# Patient Record
Sex: FEMALE | Race: WHITE | NOT HISPANIC OR LATINO | Employment: FULL TIME | URBAN - METROPOLITAN AREA
[De-identification: names, ages, dates, MRNs, and addresses within clinical notes are randomized per-mention and may not be internally consistent; named-entity substitution may affect disease eponyms.]

---

## 2017-10-19 ENCOUNTER — OFFICE VISIT - HEALTHEAST (OUTPATIENT)
Dept: FAMILY MEDICINE | Facility: CLINIC | Age: 57
End: 2017-10-19

## 2017-10-19 DIAGNOSIS — R55 SYNCOPE: ICD-10-CM

## 2017-10-19 LAB
ATRIAL RATE - MUSE: 78 BPM
DIASTOLIC BLOOD PRESSURE - MUSE: NORMAL MMHG
INTERPRETATION ECG - MUSE: NORMAL
P AXIS - MUSE: 42 DEGREES
PR INTERVAL - MUSE: 142 MS
QRS DURATION - MUSE: 96 MS
QT - MUSE: 402 MS
QTC - MUSE: 458 MS
R AXIS - MUSE: 5 DEGREES
SYSTOLIC BLOOD PRESSURE - MUSE: NORMAL MMHG
T AXIS - MUSE: 33 DEGREES
VENTRICULAR RATE- MUSE: 78 BPM

## 2017-10-19 RX ORDER — CETIRIZINE HYDROCHLORIDE 10 MG/1
10 TABLET ORAL DAILY PRN
Status: SHIPPED | COMMUNITY
Start: 2017-10-19 | End: 2023-05-12

## 2017-10-23 ENCOUNTER — AMBULATORY - HEALTHEAST (OUTPATIENT)
Dept: FAMILY MEDICINE | Facility: CLINIC | Age: 57
End: 2017-10-23

## 2017-10-30 ENCOUNTER — OFFICE VISIT - HEALTHEAST (OUTPATIENT)
Dept: FAMILY MEDICINE | Facility: CLINIC | Age: 57
End: 2017-10-30

## 2017-10-30 DIAGNOSIS — R55 SYNCOPE: ICD-10-CM

## 2017-10-30 DIAGNOSIS — I10 HYPERTENSION: ICD-10-CM

## 2017-10-30 DIAGNOSIS — Z00.00 ROUTINE GENERAL MEDICAL EXAMINATION AT A HEALTH CARE FACILITY: ICD-10-CM

## 2017-10-30 LAB
CHOLEST SERPL-MCNC: 222 MG/DL
FASTING STATUS PATIENT QL REPORTED: YES
HDLC SERPL-MCNC: 45 MG/DL
LDLC SERPL CALC-MCNC: 140 MG/DL
TRIGL SERPL-MCNC: 187 MG/DL

## 2017-10-30 ASSESSMENT — MIFFLIN-ST. JEOR: SCORE: 1408.07

## 2017-10-31 ENCOUNTER — COMMUNICATION - HEALTHEAST (OUTPATIENT)
Dept: FAMILY MEDICINE | Facility: CLINIC | Age: 57
End: 2017-10-31

## 2017-10-31 ENCOUNTER — RECORDS - HEALTHEAST (OUTPATIENT)
Dept: VASCULAR ULTRASOUND | Facility: CLINIC | Age: 57
End: 2017-10-31

## 2017-10-31 ENCOUNTER — RECORDS - HEALTHEAST (OUTPATIENT)
Dept: ADMINISTRATIVE | Facility: OTHER | Age: 57
End: 2017-10-31

## 2017-10-31 DIAGNOSIS — R55 SYNCOPE AND COLLAPSE: ICD-10-CM

## 2017-11-09 ENCOUNTER — COMMUNICATION - HEALTHEAST (OUTPATIENT)
Dept: SCHEDULING | Facility: CLINIC | Age: 57
End: 2017-11-09

## 2017-11-17 ENCOUNTER — COMMUNICATION - HEALTHEAST (OUTPATIENT)
Dept: FAMILY MEDICINE | Facility: CLINIC | Age: 57
End: 2017-11-17

## 2017-11-18 ENCOUNTER — COMMUNICATION - HEALTHEAST (OUTPATIENT)
Dept: FAMILY MEDICINE | Facility: CLINIC | Age: 57
End: 2017-11-18

## 2017-11-18 DIAGNOSIS — I10 HTN (HYPERTENSION): ICD-10-CM

## 2017-11-20 ENCOUNTER — AMBULATORY - HEALTHEAST (OUTPATIENT)
Dept: FAMILY MEDICINE | Facility: CLINIC | Age: 57
End: 2017-11-20

## 2017-11-20 DIAGNOSIS — I16.0 HYPERTENSIVE URGENCY: ICD-10-CM

## 2017-12-11 ENCOUNTER — HOSPITAL ENCOUNTER (OUTPATIENT)
Dept: MAMMOGRAPHY | Facility: CLINIC | Age: 57
Discharge: HOME | End: 2017-12-11
Attending: FAMILY MEDICINE

## 2017-12-11 DIAGNOSIS — Z00.00 ROUTINE GENERAL MEDICAL EXAMINATION AT A HEALTH CARE FACILITY: ICD-10-CM

## 2018-02-01 ENCOUNTER — COMMUNICATION - HEALTHEAST (OUTPATIENT)
Dept: TELEHEALTH | Facility: CLINIC | Age: 58
End: 2018-02-01

## 2018-02-01 ENCOUNTER — OFFICE VISIT - HEALTHEAST (OUTPATIENT)
Dept: FAMILY MEDICINE | Facility: CLINIC | Age: 58
End: 2018-02-01

## 2018-02-01 DIAGNOSIS — I10 HYPERTENSION: ICD-10-CM

## 2018-02-01 LAB
ANION GAP SERPL CALCULATED.3IONS-SCNC: 12 MMOL/L (ref 5–18)
BUN SERPL-MCNC: 6 MG/DL (ref 8–22)
CALCIUM SERPL-MCNC: 9.6 MG/DL (ref 8.5–10.5)
CHLORIDE BLD-SCNC: 94 MMOL/L (ref 98–107)
CO2 SERPL-SCNC: 26 MMOL/L (ref 22–31)
CREAT SERPL-MCNC: 0.7 MG/DL (ref 0.6–1.1)
GFR SERPL CREATININE-BSD FRML MDRD: >60 ML/MIN/1.73M2
GLUCOSE BLD-MCNC: 96 MG/DL (ref 70–125)
POTASSIUM BLD-SCNC: 3.8 MMOL/L (ref 3.5–5)
SODIUM SERPL-SCNC: 132 MMOL/L (ref 136–145)

## 2018-02-02 ENCOUNTER — COMMUNICATION - HEALTHEAST (OUTPATIENT)
Dept: FAMILY MEDICINE | Facility: CLINIC | Age: 58
End: 2018-02-02

## 2018-02-11 ENCOUNTER — COMMUNICATION - HEALTHEAST (OUTPATIENT)
Dept: FAMILY MEDICINE | Facility: CLINIC | Age: 58
End: 2018-02-11

## 2018-02-11 DIAGNOSIS — I16.0 HYPERTENSIVE URGENCY: ICD-10-CM

## 2018-02-12 ENCOUNTER — COMMUNICATION - HEALTHEAST (OUTPATIENT)
Dept: FAMILY MEDICINE | Facility: CLINIC | Age: 58
End: 2018-02-12

## 2018-02-16 ENCOUNTER — COMMUNICATION - HEALTHEAST (OUTPATIENT)
Dept: MEDSURG UNIT | Facility: HOSPITAL | Age: 58
End: 2018-02-16

## 2018-02-16 DIAGNOSIS — E03.9 HYPOTHYROID: ICD-10-CM

## 2018-03-18 ENCOUNTER — COMMUNICATION - HEALTHEAST (OUTPATIENT)
Dept: MEDSURG UNIT | Facility: HOSPITAL | Age: 58
End: 2018-03-18

## 2018-03-18 DIAGNOSIS — E03.9 HYPOTHYROID: ICD-10-CM

## 2018-04-17 ENCOUNTER — TELEPHONE (OUTPATIENT)
Dept: OTHER | Facility: CLINIC | Age: 58
End: 2018-04-17

## 2018-04-17 NOTE — TELEPHONE ENCOUNTER
4/17/2018    Call Regarding Onboarding [facility] [facility] Other     Attempt 1    Message on voicemail     Comments: 0 DEP       Outreach   CC

## 2018-04-24 ENCOUNTER — COMMUNICATION - HEALTHEAST (OUTPATIENT)
Dept: FAMILY MEDICINE | Facility: CLINIC | Age: 58
End: 2018-04-24

## 2018-04-24 DIAGNOSIS — I16.0 HYPERTENSIVE URGENCY: ICD-10-CM

## 2018-05-18 ENCOUNTER — OFFICE VISIT - HEALTHEAST (OUTPATIENT)
Dept: FAMILY MEDICINE | Facility: CLINIC | Age: 58
End: 2018-05-18

## 2018-05-18 DIAGNOSIS — I16.0 HYPERTENSIVE URGENCY: ICD-10-CM

## 2018-05-18 DIAGNOSIS — E03.9 HYPOTHYROID: ICD-10-CM

## 2018-05-18 DIAGNOSIS — I10 HYPERTENSION: ICD-10-CM

## 2018-05-18 DIAGNOSIS — M25.473 ANKLE SWELLING: ICD-10-CM

## 2018-05-18 LAB
ANION GAP SERPL CALCULATED.3IONS-SCNC: 12 MMOL/L (ref 5–18)
BUN SERPL-MCNC: 9 MG/DL (ref 8–22)
CALCIUM SERPL-MCNC: 10 MG/DL (ref 8.5–10.5)
CHLORIDE BLD-SCNC: 100 MMOL/L (ref 98–107)
CO2 SERPL-SCNC: 28 MMOL/L (ref 22–31)
CREAT SERPL-MCNC: 0.72 MG/DL (ref 0.6–1.1)
GFR SERPL CREATININE-BSD FRML MDRD: >60 ML/MIN/1.73M2
GLUCOSE BLD-MCNC: 99 MG/DL (ref 70–125)
POTASSIUM BLD-SCNC: 3.6 MMOL/L (ref 3.5–5)
SODIUM SERPL-SCNC: 140 MMOL/L (ref 136–145)
TSH SERPL DL<=0.005 MIU/L-ACNC: 0.23 UIU/ML (ref 0.3–5)

## 2018-05-21 ENCOUNTER — AMBULATORY - HEALTHEAST (OUTPATIENT)
Dept: FAMILY MEDICINE | Facility: CLINIC | Age: 58
End: 2018-05-21

## 2018-05-22 ENCOUNTER — COMMUNICATION - HEALTHEAST (OUTPATIENT)
Dept: FAMILY MEDICINE | Facility: CLINIC | Age: 58
End: 2018-05-22

## 2018-06-05 NOTE — TELEPHONE ENCOUNTER
6/5/2018    Call Regarding Onboarding [facility] [place] [facility] OTHER    Attempt 3    Message on voicemail     Comments: no dep      Outreach   CASSIUS

## 2018-07-26 ENCOUNTER — COMMUNICATION - HEALTHEAST (OUTPATIENT)
Dept: FAMILY MEDICINE | Facility: CLINIC | Age: 58
End: 2018-07-26

## 2018-08-22 ENCOUNTER — COMMUNICATION - HEALTHEAST (OUTPATIENT)
Dept: FAMILY MEDICINE | Facility: CLINIC | Age: 58
End: 2018-08-22

## 2018-08-22 DIAGNOSIS — E03.9 HYPOTHYROID: ICD-10-CM

## 2018-10-26 ENCOUNTER — COMMUNICATION - HEALTHEAST (OUTPATIENT)
Dept: FAMILY MEDICINE | Facility: CLINIC | Age: 58
End: 2018-10-26

## 2018-10-26 DIAGNOSIS — I16.0 HYPERTENSIVE URGENCY: ICD-10-CM

## 2018-11-16 ENCOUNTER — COMMUNICATION - HEALTHEAST (OUTPATIENT)
Dept: TELEHEALTH | Facility: CLINIC | Age: 58
End: 2018-11-16

## 2018-11-16 ENCOUNTER — OFFICE VISIT - HEALTHEAST (OUTPATIENT)
Dept: FAMILY MEDICINE | Facility: CLINIC | Age: 58
End: 2018-11-16

## 2018-11-16 DIAGNOSIS — E03.9 HYPOTHYROIDISM, UNSPECIFIED TYPE: ICD-10-CM

## 2018-11-16 DIAGNOSIS — I10 ESSENTIAL HYPERTENSION: ICD-10-CM

## 2018-11-16 DIAGNOSIS — Z71.6 ENCOUNTER FOR SMOKING CESSATION COUNSELING: ICD-10-CM

## 2018-11-16 DIAGNOSIS — Z12.11 SCREEN FOR COLON CANCER: ICD-10-CM

## 2018-11-16 DIAGNOSIS — E66.01 MORBID OBESITY (H): ICD-10-CM

## 2018-11-16 DIAGNOSIS — Z00.00 ANNUAL PHYSICAL EXAM: ICD-10-CM

## 2018-11-16 LAB
ANION GAP SERPL CALCULATED.3IONS-SCNC: 11 MMOL/L (ref 5–18)
BUN SERPL-MCNC: 8 MG/DL (ref 8–22)
CALCIUM SERPL-MCNC: 10.1 MG/DL (ref 8.5–10.5)
CHLORIDE BLD-SCNC: 99 MMOL/L (ref 98–107)
CHOLEST SERPL-MCNC: 236 MG/DL
CO2 SERPL-SCNC: 28 MMOL/L (ref 22–31)
CREAT SERPL-MCNC: 0.74 MG/DL (ref 0.6–1.1)
FASTING STATUS PATIENT QL REPORTED: YES
GFR SERPL CREATININE-BSD FRML MDRD: >60 ML/MIN/1.73M2
GLUCOSE BLD-MCNC: 96 MG/DL (ref 70–125)
HDLC SERPL-MCNC: 47 MG/DL
LDLC SERPL CALC-MCNC: 152 MG/DL
POTASSIUM BLD-SCNC: 3.7 MMOL/L (ref 3.5–5)
SODIUM SERPL-SCNC: 138 MMOL/L (ref 136–145)
TRIGL SERPL-MCNC: 187 MG/DL
TSH SERPL DL<=0.005 MIU/L-ACNC: 1.15 UIU/ML (ref 0.3–5)

## 2018-11-16 ASSESSMENT — MIFFLIN-ST. JEOR: SCORE: 1402

## 2018-11-19 ENCOUNTER — COMMUNICATION - HEALTHEAST (OUTPATIENT)
Dept: FAMILY MEDICINE | Facility: CLINIC | Age: 58
End: 2018-11-19

## 2018-11-29 ENCOUNTER — COMMUNICATION - HEALTHEAST (OUTPATIENT)
Dept: FAMILY MEDICINE | Facility: CLINIC | Age: 58
End: 2018-11-29

## 2018-12-04 ENCOUNTER — COMMUNICATION - HEALTHEAST (OUTPATIENT)
Dept: FAMILY MEDICINE | Facility: CLINIC | Age: 58
End: 2018-12-04

## 2018-12-26 ENCOUNTER — RECORDS - HEALTHEAST (OUTPATIENT)
Dept: ADMINISTRATIVE | Facility: OTHER | Age: 58
End: 2018-12-26

## 2019-08-07 ENCOUNTER — COMMUNICATION - HEALTHEAST (OUTPATIENT)
Dept: FAMILY MEDICINE | Facility: CLINIC | Age: 59
End: 2019-08-07

## 2019-08-07 DIAGNOSIS — E03.9 HYPOTHYROID: ICD-10-CM

## 2019-11-05 ENCOUNTER — COMMUNICATION - HEALTHEAST (OUTPATIENT)
Dept: FAMILY MEDICINE | Facility: CLINIC | Age: 59
End: 2019-11-05

## 2019-11-05 DIAGNOSIS — I10 ESSENTIAL HYPERTENSION: ICD-10-CM

## 2019-11-05 DIAGNOSIS — E03.9 HYPOTHYROID: ICD-10-CM

## 2020-02-03 ENCOUNTER — COMMUNICATION - HEALTHEAST (OUTPATIENT)
Dept: FAMILY MEDICINE | Facility: CLINIC | Age: 60
End: 2020-02-03

## 2020-02-03 DIAGNOSIS — I10 ESSENTIAL HYPERTENSION: ICD-10-CM

## 2020-02-03 DIAGNOSIS — E03.9 HYPOTHYROID: ICD-10-CM

## 2020-05-03 ENCOUNTER — COMMUNICATION - HEALTHEAST (OUTPATIENT)
Dept: FAMILY MEDICINE | Facility: CLINIC | Age: 60
End: 2020-05-03

## 2020-05-03 DIAGNOSIS — E03.9 HYPOTHYROID: ICD-10-CM

## 2020-05-03 DIAGNOSIS — I10 ESSENTIAL HYPERTENSION: ICD-10-CM

## 2020-06-02 ENCOUNTER — COMMUNICATION - HEALTHEAST (OUTPATIENT)
Dept: FAMILY MEDICINE | Facility: CLINIC | Age: 60
End: 2020-06-02

## 2020-06-02 DIAGNOSIS — E03.9 HYPOTHYROID: ICD-10-CM

## 2020-06-02 DIAGNOSIS — I10 ESSENTIAL HYPERTENSION: ICD-10-CM

## 2020-07-02 ENCOUNTER — COMMUNICATION - HEALTHEAST (OUTPATIENT)
Dept: FAMILY MEDICINE | Facility: CLINIC | Age: 60
End: 2020-07-02

## 2020-07-02 DIAGNOSIS — E03.9 HYPOTHYROID: ICD-10-CM

## 2020-07-02 DIAGNOSIS — I10 ESSENTIAL HYPERTENSION: ICD-10-CM

## 2020-08-06 ENCOUNTER — COMMUNICATION - HEALTHEAST (OUTPATIENT)
Dept: FAMILY MEDICINE | Facility: CLINIC | Age: 60
End: 2020-08-06

## 2020-08-06 DIAGNOSIS — E03.9 HYPOTHYROID: ICD-10-CM

## 2020-08-06 DIAGNOSIS — I10 ESSENTIAL HYPERTENSION: ICD-10-CM

## 2020-08-31 ENCOUNTER — COMMUNICATION - HEALTHEAST (OUTPATIENT)
Dept: FAMILY MEDICINE | Facility: CLINIC | Age: 60
End: 2020-08-31

## 2020-08-31 DIAGNOSIS — I10 ESSENTIAL HYPERTENSION: ICD-10-CM

## 2020-08-31 DIAGNOSIS — E03.9 HYPOTHYROID: ICD-10-CM

## 2020-09-04 ENCOUNTER — OFFICE VISIT - HEALTHEAST (OUTPATIENT)
Dept: FAMILY MEDICINE | Facility: CLINIC | Age: 60
End: 2020-09-04

## 2020-09-04 DIAGNOSIS — E66.01 MORBID OBESITY (H): ICD-10-CM

## 2020-09-04 DIAGNOSIS — E03.9 HYPOTHYROIDISM, UNSPECIFIED TYPE: ICD-10-CM

## 2020-09-04 DIAGNOSIS — E03.9 HYPOTHYROID: ICD-10-CM

## 2020-09-04 DIAGNOSIS — E78.2 MIXED HYPERLIPIDEMIA: ICD-10-CM

## 2020-09-04 DIAGNOSIS — I10 ESSENTIAL HYPERTENSION: ICD-10-CM

## 2020-09-04 DIAGNOSIS — Z71.6 ENCOUNTER FOR SMOKING CESSATION COUNSELING: ICD-10-CM

## 2020-09-04 DIAGNOSIS — Z00.00 ROUTINE GENERAL MEDICAL EXAMINATION AT A HEALTH CARE FACILITY: ICD-10-CM

## 2020-09-04 LAB
ANION GAP SERPL CALCULATED.3IONS-SCNC: 12 MMOL/L (ref 5–18)
BUN SERPL-MCNC: 5 MG/DL (ref 8–22)
CALCIUM SERPL-MCNC: 10.1 MG/DL (ref 8.5–10.5)
CHLORIDE BLD-SCNC: 98 MMOL/L (ref 98–107)
CHOLEST SERPL-MCNC: 227 MG/DL
CO2 SERPL-SCNC: 29 MMOL/L (ref 22–31)
CREAT SERPL-MCNC: 0.82 MG/DL (ref 0.6–1.1)
ERYTHROCYTE [DISTWIDTH] IN BLOOD BY AUTOMATED COUNT: 12.2 % (ref 11–14.5)
FASTING STATUS PATIENT QL REPORTED: YES
GFR SERPL CREATININE-BSD FRML MDRD: >60 ML/MIN/1.73M2
GLUCOSE BLD-MCNC: 102 MG/DL (ref 70–125)
HCT VFR BLD AUTO: 46.3 % (ref 35–47)
HDLC SERPL-MCNC: 48 MG/DL
HGB BLD-MCNC: 15.4 G/DL (ref 12–16)
LDLC SERPL CALC-MCNC: 145 MG/DL
MCH RBC QN AUTO: 28.3 PG (ref 27–34)
MCHC RBC AUTO-ENTMCNC: 33.2 G/DL (ref 32–36)
MCV RBC AUTO: 85 FL (ref 80–100)
PLATELET # BLD AUTO: 334 THOU/UL (ref 140–440)
PMV BLD AUTO: 7.4 FL (ref 7–10)
POTASSIUM BLD-SCNC: 3.3 MMOL/L (ref 3.5–5)
RBC # BLD AUTO: 5.44 MILL/UL (ref 3.8–5.4)
SODIUM SERPL-SCNC: 139 MMOL/L (ref 136–145)
TRIGL SERPL-MCNC: 171 MG/DL
TSH SERPL DL<=0.005 MIU/L-ACNC: 0.95 UIU/ML (ref 0.3–5)
WBC: 7.4 THOU/UL (ref 4–11)

## 2020-09-04 RX ORDER — LEVOTHYROXINE SODIUM 112 UG/1
112 TABLET ORAL DAILY
Qty: 90 TABLET | Refills: 3 | Status: SHIPPED | OUTPATIENT
Start: 2020-09-04 | End: 2021-08-29

## 2020-09-04 RX ORDER — AMLODIPINE BESYLATE 10 MG/1
10 TABLET ORAL DAILY
Qty: 90 TABLET | Refills: 3 | Status: SHIPPED | OUTPATIENT
Start: 2020-09-04 | End: 2021-08-29

## 2020-09-04 RX ORDER — LOSARTAN POTASSIUM 100 MG/1
100 TABLET ORAL DAILY
Qty: 90 TABLET | Refills: 3 | Status: SHIPPED | OUTPATIENT
Start: 2020-09-04 | End: 2021-08-29

## 2020-09-04 RX ORDER — HYDROCHLOROTHIAZIDE 25 MG/1
25 TABLET ORAL DAILY
Qty: 90 TABLET | Refills: 3 | Status: SHIPPED | OUTPATIENT
Start: 2020-09-04 | End: 2021-08-29

## 2020-09-04 ASSESSMENT — MIFFLIN-ST. JEOR: SCORE: 1384.94

## 2020-09-08 ENCOUNTER — AMBULATORY - HEALTHEAST (OUTPATIENT)
Dept: FAMILY MEDICINE | Facility: CLINIC | Age: 60
End: 2020-09-08

## 2020-09-08 DIAGNOSIS — R79.89 LOW VITAMIN D LEVEL: ICD-10-CM

## 2020-09-08 LAB
25(OH)D3 SERPL-MCNC: 9.6 NG/ML (ref 30–80)
25(OH)D3 SERPL-MCNC: 9.6 NG/ML (ref 30–80)

## 2021-03-30 ENCOUNTER — AMBULATORY - HEALTHEAST (OUTPATIENT)
Dept: NURSING | Facility: CLINIC | Age: 61
End: 2021-03-30

## 2021-04-20 ENCOUNTER — AMBULATORY - HEALTHEAST (OUTPATIENT)
Dept: NURSING | Facility: CLINIC | Age: 61
End: 2021-04-20

## 2021-05-31 VITALS — HEIGHT: 62 IN | WEIGHT: 199.25 LBS | BODY MASS INDEX: 36.67 KG/M2

## 2021-05-31 VITALS — WEIGHT: 201.6 LBS

## 2021-05-31 NOTE — TELEPHONE ENCOUNTER
Refill Approved    Rx renewed per Medication Renewal Policy. Medication was last renewed on 8/22/18.    Adeola Alegre, [facility] Triage/Med Refill 8/8/2019     Requested Prescriptions   Pending Prescriptions Disp Refills     levothyroxine (SYNTHROID, LEVOTHROID) 112 MCG tablet 90 tablet 0     Sig: TAKE 1 TABLET(112 MCG) BY MOUTH DAILY       Thyroid Hormones Protocol Passed - 8/7/2019  9:23 AM        Passed - Provider visit in past 12 months or next 3 months     Last office visit with prescriber/PCP: Visit date not found OR same dept: Visit date not found OR same specialty: 5/18/2018 Ebony Cali MD  Last physical: 11/16/2018 Last MTM visit: Visit date not found   Next visit within 3 mo: Visit date not found  Next physical within 3 mo: Visit date not found  Prescriber OR PCP: Carol Liang MD  Last diagnosis associated with med order: 1. Hypothyroid  - levothyroxine (SYNTHROID, LEVOTHROID) 112 MCG tablet; TAKE 1 TABLET(112 MCG) BY MOUTH DAILY  Dispense: 90 tablet; Refill: 0    If protocol passes may refill for 12 months if within 3 months of last provider visit (or a total of 15 months).             Passed - TSH on file in past 12 months for patient age 12 & older     TSH   Date Value Ref Range Status   11/16/2018 1.15 0.30 - 5.00 uIU/mL Final

## 2021-06-01 VITALS — WEIGHT: 195.7 LBS | BODY MASS INDEX: 36.08 KG/M2

## 2021-06-01 VITALS — WEIGHT: 197.8 LBS | BODY MASS INDEX: 36.47 KG/M2

## 2021-06-02 VITALS — BODY MASS INDEX: 37.53 KG/M2 | WEIGHT: 198.8 LBS | HEIGHT: 61 IN

## 2021-06-03 NOTE — TELEPHONE ENCOUNTER
Refill Approved    Rx renewed per Medication Renewal Policy. Medication was last renewed on 11/6/18 and 8/8/19.    Brandie Kahn, [facility] Connection Triage/Med Refill 11/5/2019     Requested Prescriptions   Pending Prescriptions Disp Refills     amLODIPine (NORVASC) 10 MG tablet [Pharmacy Med Name: AMLODIPINE BESYLATE 10MG TABLETS] 90 tablet 0     Sig: TAKE 1 TABLET BY MOUTH DAILY       Calcium-Channel Blockers Protocol Passed - 11/5/2019  5:12 AM        Passed - PCP or prescribing provider visit in past 12 months or next 3 months     Last office visit with prescriber/PCP: Visit date not found OR same dept: Visit date not found OR same specialty: 5/18/2018 Ebony Cali MD  Last physical: 11/16/2018 Last MTM visit: Visit date not found   Next visit within 3 mo: Visit date not found  Next physical within 3 mo: Visit date not found  Prescriber OR PCP: Carol Liang MD  Last diagnosis associated with med order: 1. Essential hypertension  - amLODIPine (NORVASC) 10 MG tablet [Pharmacy Med Name: AMLODIPINE BESYLATE 10MG TABLETS]; TAKE 1 TABLET BY MOUTH DAILY  Dispense: 90 tablet; Refill: 0    2. Hypothyroid  - levothyroxine (SYNTHROID, LEVOTHROID) 112 MCG tablet [Pharmacy Med Name: LEVOTHYROXINE 0.112MG (112MCG) TABS]; TAKE 1 TABLET(112 MCG) BY MOUTH DAILY  Dispense: 90 tablet; Refill: 0    If protocol passes may refill for 12 months if within 3 months of last provider visit (or a total of 15 months).             Passed - Blood pressure filed in past 12 months     BP Readings from Last 1 Encounters:   11/16/18 138/80             levothyroxine (SYNTHROID, LEVOTHROID) 112 MCG tablet [Pharmacy Med Name: LEVOTHYROXINE 0.112MG (112MCG) TABS] 90 tablet 0     Sig: TAKE 1 TABLET(112 MCG) BY MOUTH DAILY       Thyroid Hormones Protocol Passed - 11/5/2019  5:12 AM        Passed - Provider visit in past 12 months or next 3 months     Last office visit with prescriber/PCP: Visit date not found OR same dept: Visit date not  found OR same specialty: 5/18/2018 Ebony Cali MD  Last physical: 11/16/2018 Last MTM visit: Visit date not found   Next visit within 3 mo: Visit date not found  Next physical within 3 mo: Visit date not found  Prescriber OR PCP: Carol Liang MD  Last diagnosis associated with med order: 1. Essential hypertension  - amLODIPine (NORVASC) 10 MG tablet [Pharmacy Med Name: AMLODIPINE BESYLATE 10MG TABLETS]; TAKE 1 TABLET BY MOUTH DAILY  Dispense: 90 tablet; Refill: 0    2. Hypothyroid  - levothyroxine (SYNTHROID, LEVOTHROID) 112 MCG tablet [Pharmacy Med Name: LEVOTHYROXINE 0.112MG (112MCG) TABS]; TAKE 1 TABLET(112 MCG) BY MOUTH DAILY  Dispense: 90 tablet; Refill: 0    If protocol passes may refill for 12 months if within 3 months of last provider visit (or a total of 15 months).             Passed - TSH on file in past 12 months for patient age 12 & older     TSH   Date Value Ref Range Status   11/16/2018 1.15 0.30 - 5.00 uIU/mL Final

## 2021-06-04 VITALS
DIASTOLIC BLOOD PRESSURE: 76 MMHG | HEIGHT: 61 IN | WEIGHT: 195.9 LBS | BODY MASS INDEX: 36.99 KG/M2 | SYSTOLIC BLOOD PRESSURE: 128 MMHG | HEART RATE: 84 BPM

## 2021-06-05 NOTE — TELEPHONE ENCOUNTER
Reached out to patient and left a message to return phone call. When patient calls back, please relay the below message and assist with scheduling. Thank you, Jenna Gold

## 2021-06-05 NOTE — TELEPHONE ENCOUNTER
RN cannot approve Refill Request    RN can NOT refill this medication Protocol failed and NO refill given       Dorothy Camarillo, [facility] Connection Triage/Med Refill 2/3/2020    Requested Prescriptions   Pending Prescriptions Disp Refills     hydroCHLOROthiazide (HYDRODIURIL) 25 MG tablet [Pharmacy Med Name: HYDROCHLOROTHIAZIDE 25MG TABLETS] 90 tablet 3     Sig: TAKE 1 TABLET(25 MG) BY MOUTH DAILY       Diuretics/Combination Diuretics Refill Protocol  Failed - 2/3/2020  5:40 AM        Failed - Visit with PCP or prescribing provider visit in past 12 months     Last office visit with prescriber/PCP: Visit date not found OR same dept: Visit date not found OR same specialty: 5/18/2018 Ebony Cali MD  Last physical: 11/16/2018 Last MTM visit: Visit date not found   Next visit within 3 mo: Visit date not found  Next physical within 3 mo: Visit date not found  Prescriber OR PCP: Carol Liang MD  Last diagnosis associated with med order: 1. Essential hypertension  - hydroCHLOROthiazide (HYDRODIURIL) 25 MG tablet [Pharmacy Med Name: HYDROCHLOROTHIAZIDE 25MG TABLETS]; TAKE 1 TABLET(25 MG) BY MOUTH DAILY  Dispense: 90 tablet; Refill: 3  - losartan (COZAAR) 100 MG tablet [Pharmacy Med Name: LOSARTAN 100MG TABLETS]; TAKE 1 TABLET(100 MG) BY MOUTH DAILY  Dispense: 90 tablet; Refill: 3  - amLODIPine (NORVASC) 10 MG tablet [Pharmacy Med Name: AMLODIPINE BESYLATE 10MG TABLETS]; TAKE 1 TABLET BY MOUTH DAILY  Dispense: 90 tablet; Refill: 0    2. Hypothyroid  - levothyroxine (SYNTHROID, LEVOTHROID) 112 MCG tablet [Pharmacy Med Name: LEVOTHYROXINE 0.112MG (112MCG) TABS]; TAKE 1 TABLET(112 MCG) BY MOUTH DAILY  Dispense: 90 tablet; Refill: 0    If protocol passes may refill for 12 months if within 3 months of last provider visit (or a total of 15 months).             Failed - Serum Potassium in past 12 months      No results found for: LN-POTASSIUM          Failed - Serum Sodium in past 12 months      No results found for:  LN-SODIUM          Failed - Blood pressure on file in past 12 months     BP Readings from Last 1 Encounters:   11/16/18 138/80             Failed - Serum Creatinine in past 12 months      Creatinine   Date Value Ref Range Status   11/16/2018 0.74 0.60 - 1.10 mg/dL Final             levothyroxine (SYNTHROID, LEVOTHROID) 112 MCG tablet [Pharmacy Med Name: LEVOTHYROXINE 0.112MG (112MCG) TABS] 90 tablet 3     Sig: TAKE 1 TABLET(112 MCG) BY MOUTH DAILY       Thyroid Hormones Protocol Failed - 2/3/2020  5:40 AM        Failed - Provider visit in past 12 months or next 3 months     Last office visit with prescriber/PCP: Visit date not found OR same dept: Visit date not found OR same specialty: 5/18/2018 Ebony Cali MD  Last physical: 11/16/2018 Last MTM visit: Visit date not found   Next visit within 3 mo: Visit date not found  Next physical within 3 mo: Visit date not found  Prescriber OR PCP: Carol Liang MD  Last diagnosis associated with med order: 1. Essential hypertension  - hydroCHLOROthiazide (HYDRODIURIL) 25 MG tablet [Pharmacy Med Name: HYDROCHLOROTHIAZIDE 25MG TABLETS]; TAKE 1 TABLET(25 MG) BY MOUTH DAILY  Dispense: 90 tablet; Refill: 3  - losartan (COZAAR) 100 MG tablet [Pharmacy Med Name: LOSARTAN 100MG TABLETS]; TAKE 1 TABLET(100 MG) BY MOUTH DAILY  Dispense: 90 tablet; Refill: 3  - amLODIPine (NORVASC) 10 MG tablet [Pharmacy Med Name: AMLODIPINE BESYLATE 10MG TABLETS]; TAKE 1 TABLET BY MOUTH DAILY  Dispense: 90 tablet; Refill: 0    2. Hypothyroid  - levothyroxine (SYNTHROID, LEVOTHROID) 112 MCG tablet [Pharmacy Med Name: LEVOTHYROXINE 0.112MG (112MCG) TABS]; TAKE 1 TABLET(112 MCG) BY MOUTH DAILY  Dispense: 90 tablet; Refill: 0    If protocol passes may refill for 12 months if within 3 months of last provider visit (or a total of 15 months).             Failed - TSH on file in past 12 months for patient age 12 & older     TSH   Date Value Ref Range Status   11/16/2018 1.15 0.30 - 5.00 uIU/mL  Final                   losartan (COZAAR) 100 MG tablet [Pharmacy Med Name: LOSARTAN 100MG TABLETS] 90 tablet 3     Sig: TAKE 1 TABLET(100 MG) BY MOUTH DAILY       Angiotensin Receptor Blocker Protocol Failed - 2/3/2020  5:40 AM        Failed - PCP or prescribing provider visit in past 12 months       Last office visit with prescriber/PCP: Visit date not found OR same dept: Visit date not found OR same specialty: 5/18/2018 Ebnoy Clai MD  Last physical: 11/16/2018 Last MTM visit: Visit date not found   Next visit within 3 mo: Visit date not found  Next physical within 3 mo: Visit date not found  Prescriber OR PCP: Carol Liang MD  Last diagnosis associated with med order: 1. Essential hypertension  - hydroCHLOROthiazide (HYDRODIURIL) 25 MG tablet [Pharmacy Med Name: HYDROCHLOROTHIAZIDE 25MG TABLETS]; TAKE 1 TABLET(25 MG) BY MOUTH DAILY  Dispense: 90 tablet; Refill: 3  - losartan (COZAAR) 100 MG tablet [Pharmacy Med Name: LOSARTAN 100MG TABLETS]; TAKE 1 TABLET(100 MG) BY MOUTH DAILY  Dispense: 90 tablet; Refill: 3  - amLODIPine (NORVASC) 10 MG tablet [Pharmacy Med Name: AMLODIPINE BESYLATE 10MG TABLETS]; TAKE 1 TABLET BY MOUTH DAILY  Dispense: 90 tablet; Refill: 0    2. Hypothyroid  - levothyroxine (SYNTHROID, LEVOTHROID) 112 MCG tablet [Pharmacy Med Name: LEVOTHYROXINE 0.112MG (112MCG) TABS]; TAKE 1 TABLET(112 MCG) BY MOUTH DAILY  Dispense: 90 tablet; Refill: 0    If protocol passes may refill for 12 months if within 3 months of last provider visit (or a total of 15 months).             Failed - Serum potassium within the past 12 months     No results found for: LN-POTASSIUM          Failed - Blood pressure filed in past 12 months     BP Readings from Last 1 Encounters:   11/16/18 138/80             Failed - Serum creatinine within the past 12 months     Creatinine   Date Value Ref Range Status   11/16/2018 0.74 0.60 - 1.10 mg/dL Final             amLODIPine (NORVASC) 10 MG tablet [Pharmacy Med  Name: AMLODIPINE BESYLATE 10MG TABLETS] 90 tablet 3     Sig: TAKE 1 TABLET BY MOUTH DAILY       Calcium-Channel Blockers Protocol Failed - 2/3/2020  5:40 AM        Failed - PCP or prescribing provider visit in past 12 months or next 3 months     Last office visit with prescriber/PCP: Visit date not found OR same dept: Visit date not found OR same specialty: 5/18/2018 Ebony Cali MD  Last physical: 11/16/2018 Last MTM visit: Visit date not found   Next visit within 3 mo: Visit date not found  Next physical within 3 mo: Visit date not found  Prescriber OR PCP: Carol Liang MD  Last diagnosis associated with med order: 1. Essential hypertension  - hydroCHLOROthiazide (HYDRODIURIL) 25 MG tablet [Pharmacy Med Name: HYDROCHLOROTHIAZIDE 25MG TABLETS]; TAKE 1 TABLET(25 MG) BY MOUTH DAILY  Dispense: 90 tablet; Refill: 3  - losartan (COZAAR) 100 MG tablet [Pharmacy Med Name: LOSARTAN 100MG TABLETS]; TAKE 1 TABLET(100 MG) BY MOUTH DAILY  Dispense: 90 tablet; Refill: 3  - amLODIPine (NORVASC) 10 MG tablet [Pharmacy Med Name: AMLODIPINE BESYLATE 10MG TABLETS]; TAKE 1 TABLET BY MOUTH DAILY  Dispense: 90 tablet; Refill: 0    2. Hypothyroid  - levothyroxine (SYNTHROID, LEVOTHROID) 112 MCG tablet [Pharmacy Med Name: LEVOTHYROXINE 0.112MG (112MCG) TABS]; TAKE 1 TABLET(112 MCG) BY MOUTH DAILY  Dispense: 90 tablet; Refill: 0    If protocol passes may refill for 12 months if within 3 months of last provider visit (or a total of 15 months).             Failed - Blood pressure filed in past 12 months     BP Readings from Last 1 Encounters:   11/16/18 138/80

## 2021-06-07 NOTE — TELEPHONE ENCOUNTER
Patient is not been in clinic since 11-16-18. Please advise. I did leave a message to schedule a medication check.

## 2021-06-07 NOTE — TELEPHONE ENCOUNTER
Last OV 11/2018. Agree with need for med check and eventual labs. Will change to 30d refill until this visit is done.

## 2021-06-08 NOTE — TELEPHONE ENCOUNTER
I do agree she needs a virtual med check since last visit was 11/2018. This will be the last 4 week fill until she can schedule a virtual visit.

## 2021-06-08 NOTE — TELEPHONE ENCOUNTER
Last office visit 11-16-18. Last dispensed 5-4-20 stating that she needs a medication check. Left a message to schedule.

## 2021-06-09 NOTE — TELEPHONE ENCOUNTER
Reached out to patient and left a message to return phone call. When patient calls back, please see the below message and assist with scheduling. Thank you,  Jenna Gold

## 2021-06-10 NOTE — TELEPHONE ENCOUNTER
RN cannot approve Refill Request    RN can NOT refill this medication Protocol failed and NO refill given. Last office visit: Visit date not found Last Physical: 11/16/2018 Last MTM visit: Visit date not found Last visit same specialty: 5/18/2018 Ebony Cali MD.  Next visit within 3 mo: Visit date not found  Next physical within 3 mo: Visit date not found      Dorothy Camarillo, [facility] Connection Triage/Med Refill 8/6/2020    Requested Prescriptions   Pending Prescriptions Disp Refills     hydroCHLOROthiazide (HYDRODIURIL) 25 MG tablet [Pharmacy Med Name: HYDROCHLOROTHIAZIDE 25MG TABLETS] 30 tablet 0     Sig: TAKE 1 TABLET(25 MG) BY MOUTH DAILY       Diuretics/Combination Diuretics Refill Protocol  Failed - 8/6/2020  6:16 AM        Failed - Visit with PCP or prescribing provider visit in past 12 months     Last office visit with prescriber/PCP: Visit date not found OR same dept: Visit date not found OR same specialty: 5/18/2018 Ebony Cali MD  Last physical: 11/16/2018 Last MTM visit: Visit date not found   Next visit within 3 mo: Visit date not found  Next physical within 3 mo: Visit date not found  Prescriber OR PCP: Carol Liang MD  Last diagnosis associated with med order: 1. Essential hypertension  - hydroCHLOROthiazide (HYDRODIURIL) 25 MG tablet [Pharmacy Med Name: HYDROCHLOROTHIAZIDE 25MG TABLETS]; TAKE 1 TABLET(25 MG) BY MOUTH DAILY  Dispense: 30 tablet; Refill: 0  - losartan (COZAAR) 100 MG tablet [Pharmacy Med Name: LOSARTAN 100MG TABLETS]; TAKE 1 TABLET(100 MG) BY MOUTH DAILY  Dispense: 30 tablet; Refill: 0  - amLODIPine (NORVASC) 10 MG tablet [Pharmacy Med Name: AMLODIPINE BESYLATE 10MG TABLETS]; TAKE 1 TABLET(10 MG) BY MOUTH DAILY  Dispense: 30 tablet; Refill: 0    2. Hypothyroid  - levothyroxine (SYNTHROID, LEVOTHROID) 112 MCG tablet [Pharmacy Med Name: LEVOTHYROXINE 0.112MG (112MCG) TABS]; TAKE 1 TABLET(112 MCG) BY MOUTH DAILY  Dispense: 30 tablet; Refill: 0    If protocol passes  may refill for 12 months if within 3 months of last provider visit (or a total of 15 months).             Failed - Serum Potassium in past 12 months      No results found for: LN-POTASSIUM          Failed - Serum Sodium in past 12 months      No results found for: LN-SODIUM          Failed - Blood pressure on file in past 12 months     BP Readings from Last 1 Encounters:   11/16/18 138/80             Failed - Serum Creatinine in past 12 months      Creatinine   Date Value Ref Range Status   11/16/2018 0.74 0.60 - 1.10 mg/dL Final                losartan (COZAAR) 100 MG tablet [Pharmacy Med Name: LOSARTAN 100MG TABLETS] 30 tablet 0     Sig: TAKE 1 TABLET(100 MG) BY MOUTH DAILY       Angiotensin Receptor Blocker Protocol Failed - 8/6/2020  6:16 AM        Failed - PCP or prescribing provider visit in past 12 months       Last office visit with prescriber/PCP: Visit date not found OR same dept: Visit date not found OR same specialty: 5/18/2018 Ebony Cali MD  Last physical: 11/16/2018 Last MTM visit: Visit date not found   Next visit within 3 mo: Visit date not found  Next physical within 3 mo: Visit date not found  Prescriber OR PCP: Carol Liang MD  Last diagnosis associated with med order: 1. Essential hypertension  - hydroCHLOROthiazide (HYDRODIURIL) 25 MG tablet [Pharmacy Med Name: HYDROCHLOROTHIAZIDE 25MG TABLETS]; TAKE 1 TABLET(25 MG) BY MOUTH DAILY  Dispense: 30 tablet; Refill: 0  - losartan (COZAAR) 100 MG tablet [Pharmacy Med Name: LOSARTAN 100MG TABLETS]; TAKE 1 TABLET(100 MG) BY MOUTH DAILY  Dispense: 30 tablet; Refill: 0  - amLODIPine (NORVASC) 10 MG tablet [Pharmacy Med Name: AMLODIPINE BESYLATE 10MG TABLETS]; TAKE 1 TABLET(10 MG) BY MOUTH DAILY  Dispense: 30 tablet; Refill: 0    2. Hypothyroid  - levothyroxine (SYNTHROID, LEVOTHROID) 112 MCG tablet [Pharmacy Med Name: LEVOTHYROXINE 0.112MG (112MCG) TABS]; TAKE 1 TABLET(112 MCG) BY MOUTH DAILY  Dispense: 30 tablet; Refill: 0    If protocol  passes may refill for 12 months if within 3 months of last provider visit (or a total of 15 months).             Failed - Serum potassium within the past 12 months     No results found for: LN-POTASSIUM          Failed - Blood pressure filed in past 12 months     BP Readings from Last 1 Encounters:   11/16/18 138/80             Failed - Serum creatinine within the past 12 months     Creatinine   Date Value Ref Range Status   11/16/2018 0.74 0.60 - 1.10 mg/dL Final                amLODIPine (NORVASC) 10 MG tablet [Pharmacy Med Name: AMLODIPINE BESYLATE 10MG TABLETS] 30 tablet 0     Sig: TAKE 1 TABLET(10 MG) BY MOUTH DAILY       Calcium-Channel Blockers Protocol Failed - 8/6/2020  6:16 AM        Failed - PCP or prescribing provider visit in past 12 months or next 3 months     Last office visit with prescriber/PCP: Visit date not found OR same dept: Visit date not found OR same specialty: 5/18/2018 Ebony Cali MD  Last physical: 11/16/2018 Last MTM visit: Visit date not found   Next visit within 3 mo: Visit date not found  Next physical within 3 mo: Visit date not found  Prescriber OR PCP: Carol Liang MD  Last diagnosis associated with med order: 1. Essential hypertension  - hydroCHLOROthiazide (HYDRODIURIL) 25 MG tablet [Pharmacy Med Name: HYDROCHLOROTHIAZIDE 25MG TABLETS]; TAKE 1 TABLET(25 MG) BY MOUTH DAILY  Dispense: 30 tablet; Refill: 0  - losartan (COZAAR) 100 MG tablet [Pharmacy Med Name: LOSARTAN 100MG TABLETS]; TAKE 1 TABLET(100 MG) BY MOUTH DAILY  Dispense: 30 tablet; Refill: 0  - amLODIPine (NORVASC) 10 MG tablet [Pharmacy Med Name: AMLODIPINE BESYLATE 10MG TABLETS]; TAKE 1 TABLET(10 MG) BY MOUTH DAILY  Dispense: 30 tablet; Refill: 0    2. Hypothyroid  - levothyroxine (SYNTHROID, LEVOTHROID) 112 MCG tablet [Pharmacy Med Name: LEVOTHYROXINE 0.112MG (112MCG) TABS]; TAKE 1 TABLET(112 MCG) BY MOUTH DAILY  Dispense: 30 tablet; Refill: 0    If protocol passes may refill for 12 months if within 3  months of last provider visit (or a total of 15 months).             Failed - Blood pressure filed in past 12 months     BP Readings from Last 1 Encounters:   11/16/18 138/80                levothyroxine (SYNTHROID, LEVOTHROID) 112 MCG tablet [Pharmacy Med Name: LEVOTHYROXINE 0.112MG (112MCG) TABS] 30 tablet 0     Sig: TAKE 1 TABLET(112 MCG) BY MOUTH DAILY       Thyroid Hormones Protocol Failed - 8/6/2020  6:16 AM        Failed - Provider visit in past 12 months or next 3 months     Last office visit with prescriber/PCP: Visit date not found OR same dept: Visit date not found OR same specialty: 5/18/2018 Ebony Cali MD  Last physical: 11/16/2018 Last MTM visit: Visit date not found   Next visit within 3 mo: Visit date not found  Next physical within 3 mo: Visit date not found  Prescriber OR PCP: Carol Liang MD  Last diagnosis associated with med order: 1. Essential hypertension  - hydroCHLOROthiazide (HYDRODIURIL) 25 MG tablet [Pharmacy Med Name: HYDROCHLOROTHIAZIDE 25MG TABLETS]; TAKE 1 TABLET(25 MG) BY MOUTH DAILY  Dispense: 30 tablet; Refill: 0  - losartan (COZAAR) 100 MG tablet [Pharmacy Med Name: LOSARTAN 100MG TABLETS]; TAKE 1 TABLET(100 MG) BY MOUTH DAILY  Dispense: 30 tablet; Refill: 0  - amLODIPine (NORVASC) 10 MG tablet [Pharmacy Med Name: AMLODIPINE BESYLATE 10MG TABLETS]; TAKE 1 TABLET(10 MG) BY MOUTH DAILY  Dispense: 30 tablet; Refill: 0    2. Hypothyroid  - levothyroxine (SYNTHROID, LEVOTHROID) 112 MCG tablet [Pharmacy Med Name: LEVOTHYROXINE 0.112MG (112MCG) TABS]; TAKE 1 TABLET(112 MCG) BY MOUTH DAILY  Dispense: 30 tablet; Refill: 0    If protocol passes may refill for 12 months if within 3 months of last provider visit (or a total of 15 months).             Failed - TSH on file in past 12 months for patient age 12 & older     TSH   Date Value Ref Range Status   11/16/2018 1.15 0.30 - 5.00 uIU/mL Final

## 2021-06-10 NOTE — TELEPHONE ENCOUNTER
Pt scheduled to see me 9/4/2020. Will send for 1 month fill. Needs labs/med check  Carol Liang MD

## 2021-06-11 NOTE — TELEPHONE ENCOUNTER
RN cannot approve Refill Request    RN can NOT refill this medication Protocol failed and NO refill given. Last office visit: Visit date not found Last Physical: 11/16/2018 Last MTM visit: Visit date not found Last visit same specialty: 5/18/2018 Ebony Cali MD.  Next visit within 3 mo: Visit date not found  Next physical within 3 mo: Visit date not found      Dorothy Camarillo, [facility] Triage/Med Refill 9/2/2020    Requested Prescriptions   Pending Prescriptions Disp Refills     levothyroxine (SYNTHROID, LEVOTHROID) 112 MCG tablet [Pharmacy Med Name: LEVOTHYROXINE 0.112MG (112MCG) TABS] 30 tablet 0     Sig: TAKE 1 TABLET(112 MCG) BY MOUTH DAILY       Thyroid Hormones Protocol Failed - 8/31/2020  6:02 AM        Failed - Provider visit in past 12 months or next 3 months     Last office visit with prescriber/PCP: Visit date not found OR same dept: Visit date not found OR same specialty: 5/18/2018 Ebony Cali MD  Last physical: 11/16/2018 Last MTM visit: Visit date not found   Next visit within 3 mo: Visit date not found  Next physical within 3 mo: Visit date not found  Prescriber OR PCP: Carol Liang MD  Last diagnosis associated with med order: 1. Hypothyroid  - levothyroxine (SYNTHROID, LEVOTHROID) 112 MCG tablet [Pharmacy Med Name: LEVOTHYROXINE 0.112MG (112MCG) TABS]; TAKE 1 TABLET(112 MCG) BY MOUTH DAILY  Dispense: 30 tablet; Refill: 0    2. Essential hypertension  - hydroCHLOROthiazide (HYDRODIURIL) 25 MG tablet [Pharmacy Med Name: HYDROCHLOROTHIAZIDE 25MG TABLETS]; TAKE 1 TABLET(25 MG) BY MOUTH DAILY  Dispense: 30 tablet; Refill: 0  - amLODIPine (NORVASC) 10 MG tablet [Pharmacy Med Name: AMLODIPINE BESYLATE 10MG TABLETS]; TAKE 1 TABLET(10 MG) BY MOUTH DAILY  Dispense: 30 tablet; Refill: 0  - losartan (COZAAR) 100 MG tablet [Pharmacy Med Name: LOSARTAN 100MG TABLETS]; TAKE 1 TABLET(100 MG) BY MOUTH DAILY  Dispense: 30 tablet; Refill: 0    If protocol passes may refill for 12 months  if within 3 months of last provider visit (or a total of 15 months).             Failed - TSH on file in past 12 months for patient age 12 & older     TSH   Date Value Ref Range Status   11/16/2018 1.15 0.30 - 5.00 uIU/mL Final                      hydroCHLOROthiazide (HYDRODIURIL) 25 MG tablet [Pharmacy Med Name: HYDROCHLOROTHIAZIDE 25MG TABLETS] 30 tablet 0     Sig: TAKE 1 TABLET(25 MG) BY MOUTH DAILY       Diuretics/Combination Diuretics Refill Protocol  Failed - 8/31/2020  6:02 AM        Failed - Visit with PCP or prescribing provider visit in past 12 months     Last office visit with prescriber/PCP: Visit date not found OR same dept: Visit date not found OR same specialty: 5/18/2018 Ebony Cali MD  Last physical: 11/16/2018 Last MTM visit: Visit date not found   Next visit within 3 mo: Visit date not found  Next physical within 3 mo: Visit date not found  Prescriber OR PCP: Carol Liang MD  Last diagnosis associated with med order: 1. Hypothyroid  - levothyroxine (SYNTHROID, LEVOTHROID) 112 MCG tablet [Pharmacy Med Name: LEVOTHYROXINE 0.112MG (112MCG) TABS]; TAKE 1 TABLET(112 MCG) BY MOUTH DAILY  Dispense: 30 tablet; Refill: 0    2. Essential hypertension  - hydroCHLOROthiazide (HYDRODIURIL) 25 MG tablet [Pharmacy Med Name: HYDROCHLOROTHIAZIDE 25MG TABLETS]; TAKE 1 TABLET(25 MG) BY MOUTH DAILY  Dispense: 30 tablet; Refill: 0  - amLODIPine (NORVASC) 10 MG tablet [Pharmacy Med Name: AMLODIPINE BESYLATE 10MG TABLETS]; TAKE 1 TABLET(10 MG) BY MOUTH DAILY  Dispense: 30 tablet; Refill: 0  - losartan (COZAAR) 100 MG tablet [Pharmacy Med Name: LOSARTAN 100MG TABLETS]; TAKE 1 TABLET(100 MG) BY MOUTH DAILY  Dispense: 30 tablet; Refill: 0    If protocol passes may refill for 12 months if within 3 months of last provider visit (or a total of 15 months).             Failed - Serum Potassium in past 12 months      No results found for: LN-POTASSIUM          Failed - Serum Sodium in past 12 months      No  results found for: LN-SODIUM          Failed - Blood pressure on file in past 12 months     BP Readings from Last 1 Encounters:   11/16/18 138/80             Failed - Serum Creatinine in past 12 months      Creatinine   Date Value Ref Range Status   11/16/2018 0.74 0.60 - 1.10 mg/dL Final                amLODIPine (NORVASC) 10 MG tablet [Pharmacy Med Name: AMLODIPINE BESYLATE 10MG TABLETS] 30 tablet 0     Sig: TAKE 1 TABLET(10 MG) BY MOUTH DAILY       Calcium-Channel Blockers Protocol Failed - 8/31/2020  6:02 AM        Failed - PCP or prescribing provider visit in past 12 months or next 3 months     Last office visit with prescriber/PCP: Visit date not found OR same dept: Visit date not found OR same specialty: 5/18/2018 Ebony Cali MD  Last physical: 11/16/2018 Last MTM visit: Visit date not found   Next visit within 3 mo: Visit date not found  Next physical within 3 mo: Visit date not found  Prescriber OR PCP: Carol Liang MD  Last diagnosis associated with med order: 1. Hypothyroid  - levothyroxine (SYNTHROID, LEVOTHROID) 112 MCG tablet [Pharmacy Med Name: LEVOTHYROXINE 0.112MG (112MCG) TABS]; TAKE 1 TABLET(112 MCG) BY MOUTH DAILY  Dispense: 30 tablet; Refill: 0    2. Essential hypertension  - hydroCHLOROthiazide (HYDRODIURIL) 25 MG tablet [Pharmacy Med Name: HYDROCHLOROTHIAZIDE 25MG TABLETS]; TAKE 1 TABLET(25 MG) BY MOUTH DAILY  Dispense: 30 tablet; Refill: 0  - amLODIPine (NORVASC) 10 MG tablet [Pharmacy Med Name: AMLODIPINE BESYLATE 10MG TABLETS]; TAKE 1 TABLET(10 MG) BY MOUTH DAILY  Dispense: 30 tablet; Refill: 0  - losartan (COZAAR) 100 MG tablet [Pharmacy Med Name: LOSARTAN 100MG TABLETS]; TAKE 1 TABLET(100 MG) BY MOUTH DAILY  Dispense: 30 tablet; Refill: 0    If protocol passes may refill for 12 months if within 3 months of last provider visit (or a total of 15 months).             Failed - Blood pressure filed in past 12 months     BP Readings from Last 1 Encounters:   11/16/18 138/80                 losartan (COZAAR) 100 MG tablet [Pharmacy Med Name: LOSARTAN 100MG TABLETS] 30 tablet 0     Sig: TAKE 1 TABLET(100 MG) BY MOUTH DAILY       Angiotensin Receptor Blocker Protocol Failed - 8/31/2020  6:02 AM        Failed - PCP or prescribing provider visit in past 12 months       Last office visit with prescriber/PCP: Visit date not found OR same dept: Visit date not found OR same specialty: 5/18/2018 Ebony Cali MD  Last physical: 11/16/2018 Last MTM visit: Visit date not found   Next visit within 3 mo: Visit date not found  Next physical within 3 mo: Visit date not found  Prescriber OR PCP: Carol Liang MD  Last diagnosis associated with med order: 1. Hypothyroid  - levothyroxine (SYNTHROID, LEVOTHROID) 112 MCG tablet [Pharmacy Med Name: LEVOTHYROXINE 0.112MG (112MCG) TABS]; TAKE 1 TABLET(112 MCG) BY MOUTH DAILY  Dispense: 30 tablet; Refill: 0    2. Essential hypertension  - hydroCHLOROthiazide (HYDRODIURIL) 25 MG tablet [Pharmacy Med Name: HYDROCHLOROTHIAZIDE 25MG TABLETS]; TAKE 1 TABLET(25 MG) BY MOUTH DAILY  Dispense: 30 tablet; Refill: 0  - amLODIPine (NORVASC) 10 MG tablet [Pharmacy Med Name: AMLODIPINE BESYLATE 10MG TABLETS]; TAKE 1 TABLET(10 MG) BY MOUTH DAILY  Dispense: 30 tablet; Refill: 0  - losartan (COZAAR) 100 MG tablet [Pharmacy Med Name: LOSARTAN 100MG TABLETS]; TAKE 1 TABLET(100 MG) BY MOUTH DAILY  Dispense: 30 tablet; Refill: 0    If protocol passes may refill for 12 months if within 3 months of last provider visit (or a total of 15 months).             Failed - Serum potassium within the past 12 months     No results found for: LN-POTASSIUM          Failed - Blood pressure filed in past 12 months     BP Readings from Last 1 Encounters:   11/16/18 138/80             Failed - Serum creatinine within the past 12 months     Creatinine   Date Value Ref Range Status   11/16/2018 0.74 0.60 - 1.10 mg/dL Final

## 2021-06-11 NOTE — TELEPHONE ENCOUNTER
Pt has visit with me in 2 days; last refill send on 8/6 and should have enough. Plan to refill for longer duration/supply as of 9/4 visit.

## 2021-06-11 NOTE — PATIENT INSTRUCTIONS - HE
"MINDFULNESS    \"Mindfulness is about being fully awake in our lives. It is about perceiving the exquisite vividness of each moment.\"      - Rakesh Cardenas  Mindfulness-Based Stress Reduction (MBSR) is a blend of meditation, body awareness, and yoga:   learning through practice and study how your body handles (and can resolve) stress neurologically.     Mindfulness Resources (all are free):  1. \"Calm\" conner- free trial has guided 10min sessions on anxiety, gratitude, sleep, happiness, managing stress, etc.   2. \"Smiling Minds\" conner- short guided sessions for children and adults  3. \"Insight Timer\" conner- free meditation timer with musical or bell tones, can also stream guided meditations    4. Free 8 week MBSR program online: [email or web address]/        SHINGLES VACCINE  There is a new shingles vaccine that is 97% effective. It is the Shingrix vaccine and is recommended for those 50 and older. We recommend the vaccine even if you have had shingles or the older vaccine against shingles- Zostavax. Insurance coverage for the vaccine varies. I recommend you check with your insurance to verify if, when and where it is covered. The vaccine is covered by most commercial insurance but Medicare does not cover the vaccine. It may be covered by Medicare Part D (your drug/pharmacy plan) and sometimes it is covered only at a pharmacy instead of here in the clinic. If it is covered in the clinic, you may schedule a nurse visit anytime to get the first dose of the vaccine. The second dose is recommended two months after the first and should be gotten by 6 months after the first.   About 10% of people will get a sore, red reaction at the site of the injection. Some people may also feel a little sick or nauseated after the injection. This may happen with either the first and/or second vaccine.        "

## 2021-06-13 NOTE — PROGRESS NOTES
"ASSESSMENT/PLAN:   1. Syncope  Orthostatic blood pressure    Electrocardiogram Perform - Clinic       Patient presents for evaluation of a witnessed syncopal episode last week and intermittent dizziness. Patient was hypertensive to 228/102 upon presentation, but was otherwise vitally WNL.She was asymptomatic upon presentation. This may be chronically untreated HTN. She has a reported PMH HTN, but has not had treatment in 6 years. Differential includes but is not limited to: subacute stroke, dysrhythmia, hypertensive emergency.   Less malicious differentials include BPPV (though no nystagmus on exam, no nausea makes this less likely), orthostasis (she did have appreciable drop in systolic BP with orthostatics in clinic today, without compensatory increase in HR).  Given patient's history of untreated HTN, smoking, she would benefit from emergent workup to include possible MRI head, stat laboratory evaluation, possible cardiac rule out.  I spoke with Dr Patrick at [facility] ED who accepted patient information.  Discussed with patient the need for further evaluation in the ED. She and her son understood and agreed. Since EKG showed no arrhythmia or ischemic changes, and she was stable, she is appropriate for private transport across the street to [facility] ED.            SUBJECTIVE:   Carmen Germain is a 57 y.o. female who presents today for evaluation of dizziness intermittently over the last few weeks and a syncopal episode. She describes a sensation of room spinning around her. She feels off balance. She denies falls. She feels like her vision \"feels funny.\" No headaches, facial droop, weakness, confusion.  Her son is present today and provides supplemental history.  She says that 1.5 weeks ago she had an episode of witnessed syncope.  She was talking to her son and had a \"black out.\" she admits to antecedent lightheadedness. Her son witnessed the entire episode. He says she slumped over in the chair and was " "mumbling. Her son says that \"he thought she was having a stroke because her face seemed to droop\" but he put her through the \"stroke tests\" and they were all normal. He says that she opened her eyes after 5 seconds and \"came to.\" She denies antecedent chest pain, shortness of breath, palpitations, or diaphoresis. She sometimes gets palpitations she says \"if she gets stressed or panic-y.\"   They did not seek any medical care at the time.   She is trying to drink a lot of water. She denies dark urine. She is eating normally for her.   No fevers. She feels \"warm.\" No nausea, vomiting, abdominal pain, black or bloody stools. No cough outside her normal allergies sypmtoms. No leg swelling or calf pain.  She does have a history of HTN but has not been medicated for 6 years. She says she has not been to the doctor for 6 years. She also has a history of hypothyroidism and has not had treatment for this in 6 years either. She denies a known history of heart disease. She is a smoker. No PMH diabetes.        Past Medical History:  PMH HTN (untreated currently)  PMH hypothyroidsm (untreated currently)    Surgical History:  No recent surgeries    Family History:  Family History   Problem Relation Age of Onset     Breast cancer Mother      Cancer Mother      Hypertension Mother      Hypertension Father      Lung cancer Father          Social History:    History   Smoking Status     Current Every Day Smoker   Smokeless Tobacco     Never Used     Smokin.75 ppd x 40 years  Alcohol use: 5-6 drinks/month  Other drug use: none  Occupation: desk work      Current Medications:  No current outpatient prescriptions on file prior to visit.     No current facility-administered medications on file prior to visit.        Allergies:   No Known Allergies    I personally reviewed patient's past medical, surgical, social, family history and allergies.    ROS:  Review of Systems  See HPI for complete 12 pt ROS, otherwise " negative      OBJECTIVE:   Vitals:    10/19/17 1055 10/19/17 1154 10/19/17 1155 10/19/17 1156   BP: (!) 228/102 (!) 212/103 (!) 204/112 (!) 183/92   Patient Site:  Right Arm Right Arm Right Arm   Patient Position:  Lying Sitting Standing   Pulse: 92 93 90 93   Resp: 14      Temp: 98.1  F (36.7  C)      TempSrc: Oral      SpO2: 98% 98% 99% 99%   Weight: 201 lb 9.6 oz (91.4 kg)          General Appearance:  Alert,  well-appearing female in NAD. Afebrile.    Integument: no diaphoresis.  HEENT:  Head: Atraumatic, normocephalic. Face nontraumatic.  Eyes: Conjunctiva clear. PERRL. EOMI.  Ears:  TMs pearly, translucent bilaterally. No canal erythema or edema.  Oropharynx: Tongue and uvula midline. Moist mucus membranes.  Neck: Supple.  Respiratory: No distress. Lungs clear to ausculation bilaterally. No crackles, wheezes, rhonchi or stridor.  Cardiovascular: Regular rate and rhythm, no murmur, rub or gallop. No obvious chest wall deformities. Peripheral pulses 2+ bilaterally at radials.  Neurologic: Alert & oriented appropriately. Normal tone. PERRL. Normal speech, no dysarthria. CN 3/4/6 EOMI without nystagmus, 5 Sensory intact, 7 Motor intact, face symmetric, 8 Hearing intact, 9,10 11 normal strength, 12 Tongue midline.  Motor: moves all extremities equally. No pronator drift. Sensory: intact distally. Gait: steady gait, no antalgia. Psychomotor slowing (-). Abnormal Movements (-).Rapid alternating Movements intact. Finger to nose intact bilaterally.   Psych: Normal mood and affect.           Laboratory Studies:  I personally ordered and interpreted these studies.    Results for orders placed or performed in visit on 10/19/17   Electrocardiogram Perform - Clinic   Result Value Ref Range    SYSTOLIC BLOOD PRESSURE  mmHg    DIASTOLIC BLOOD PRESSURE  mmHg    VENTRICULAR RATE 78 BPM    ATRIAL RATE 78 BPM    P-R INTERVAL 142 ms    QRS DURATION 96 ms    Q-T INTERVAL 402 ms    QTC CALCULATION (BEZET) 458 ms    P Axis 42 degrees     R AXIS 5 degrees    T AXIS 33 degrees    MUSE DIAGNOSIS       Normal sinus rhythm  Normal ECG  No previous ECGs available

## 2021-06-13 NOTE — PROGRESS NOTES
Assessment/Plan:        Diagnoses and all orders for this visit:    Routine general medical examination at a health care facility  -     Lipid Cascade  -     HM2(CBC w/o Differential)  -     Basic Metabolic Panel  -     Ambulatory referral for Colonoscopy  -     Mammo Screening Bilateral; Future; Expected date: 10/30/17    Hypertension  -     Lipid Cascade  -     Basic Metabolic Panel    Syncope  -     US Carotid Bilateral; Future; Expected date: 10/30/17    Other orders  -     Tdap vaccine,  8yo or older,  IM  -     hydroCHLOROthiazide (HYDRODIURIL) 12.5 MG tablet; Take 1 tablet (12.5 mg total) by mouth daily.  Dispense: 30 tablet; Refill: 0        She is fasting.  We will do labs.  Consider colonoscopy, mammogram.  Encourage annual physical.  To improve food choices, less of red meat.  Increase physical activity as tolerated.  Will provide her Tdap.  For her hypertension, will add hydrochlorothiazide.  Discussed medication use.  Discussed timing of intake for her levothyroxine.  Call me by the end of this week for her blood pressure readings.  Monitor blood pressure and discuss goals and timing of checks.  We will also do an ultrasound of her carotids.  Await results prior to further recommendations.  Follow-up in a month, earlier if symptomatic.  She was agreeable with the plans.  Subjective:    Patient ID: Camren Germain is a 57 y.o. female.    MARIE Morales is here for her physical.  She was recently in the hospital, ER and walk-in clinic for uncontrolled hypertension. Initially seen on October 19, 2017.  A week prior to that, she lost consciousness for a few seconds.  She felt flushed, off before she fainted.  Denies any loss of control of her bowel or bladder.  Her son was there.  Try to hydrate and her symptoms improve.  She then had uncomfortable sensation, dizziness as if vertigo, felt funny, shaky.  Tingling in her body and more on her left upper extremity.  Decided to go to walk-in clinic on October 19.   EKG normal.  Blood pressure up to 228/102.  Was sent to ER and admitted overnight.  CT, MRI of her head were negative.  Started on losartan 25 mg daily.  Scheduled to follow-up here but continues to feel funny which comes in waves.  Episodes of dizziness.  Went to walk-in clinic and was advised to increase losartan to 50 mg.  Persistent symptoms led to another ER visit on October 24.  Advised to increase losartan to 100 mg daily.  Since then, the dizziness and funny sensation is better.  She is anxious and stressed as she needs to move to a different apartment building.  Whenever she is stressed, she is shaky.  Feels tired.  Denies any chest pains, palpitations.  No syncopal events.  Almost done with her move.  She will have her cousins come over to help as well.  Recalls of being on a medication before but because persistent coughing.  Has been here for 6 years and has not established with a provider.  Prior to that was in [place] for 8 years.  She was started on blood pressure medication around 1 and half years prior to moving here.    She has hypothyroidism.  She has not been able to continue her levothyroxine once she moved here.  She did not have a job for 8 months.  After that was busy.  She has started her levothyroxine and takes it in a fasting state.  TSH last checked on October 19 and was at 17.32.     Has not had a mammogram in more than 6 years.  Her mom has breast cancer in her late 40s.  Her last Pap smear was around 14 years ago.  This was before her hysterectomy at age 33.  No previous abnormal Pap smear.  She was told that she does not need a Pap smear anymore.  She has never had any colonoscopy.  No recent fractures.    Review of Systems  As above otherwise negative.    Past medical history: Hypertension.  Hypothyroidism.  DVT when she was in her 20s and due to OCP.    Past surgical history: Bone mass removed on her right ribs when she was 9, benign.  Tonsillectomy.  Hysterectomy for benign  "reasons.  Ovaries intact.    Family history: Parents with hypertension.  Father with lung cancer.  Mom with breast cancer in her late 40s, another cancer but has already metastasized.  When discovered and unsure of primary paternal grandmother with cancer but unsure of primary.  Maternal grandmother with colon cancer in her 60s.    Social history: She is smoking half a pack to 1 pack per day for 40 years.  5-6 alcoholic beverage per month.  She does not have any regular exercise but plans to obtain a stationary bike from her friend.  Tries to eat healthy but more of red meat.        Objective:    Physical Exam  BP (!) 160/100 (Patient Site: Right Arm, Patient Position: Sitting, Cuff Size: Adult Regular)  Pulse 96  Ht 5' 1.75\" (1.568 m)  Wt 199 lb 4 oz (90.4 kg)  Breastfeeding? No  BMI 36.74 kg/m2    Vital signs noted above. AAO ×3.  HEENT negative.  Neck: Supple neck, nonpalpable cervical lymph nodes. No thyromegaly. Lungs: Clear to auscultation bilateral.  Heart: S1-S2 regular rate and rhythm, no murmurs were noted.  Abdomen: Flabby, soft with bowel sounds and nontender.  Extremities: No edema, pulses were full and equal. Breast exam: No nipple bleeding or discharge, no mass or tenderness, no axillary lymphadenopathy.  Pelvic exam: No adnexal mass or tenderness.          "

## 2021-06-16 PROBLEM — Z71.6 ENCOUNTER FOR SMOKING CESSATION COUNSELING: Status: ACTIVE | Noted: 2018-11-18

## 2021-06-16 PROBLEM — I10 ESSENTIAL HYPERTENSION: Status: ACTIVE | Noted: 2017-10-19

## 2021-06-16 PROBLEM — E03.9 HYPOTHYROID: Status: ACTIVE | Noted: 2017-10-19

## 2021-06-16 PROBLEM — E66.01 MORBID OBESITY (H): Status: ACTIVE | Noted: 2018-11-18

## 2021-06-18 NOTE — PROGRESS NOTES
Assessment/Plan:        Diagnoses and all orders for this visit:    Hypertension  -     Basic Metabolic Panel    Hypertensive urgency  -     amLODIPine (NORVASC) 10 MG tablet; Take 1 tablet (10 mg total) by mouth daily.  Dispense: 90 tablet; Refill: 0    Hypothyroid  -     Thyroid Stimulating Hormone (TSH)    Ankle swelling        She is not fasting.  We will do labs.  Continue with medications.  Ankle swelling could be related to medication, salt intake.  Frequent leg elevation.  Compression stockings.  Low-salt diet.  Continue to improve food choices.  We also discussed about smoking cessation and having target goals.  Continue to monitor blood pressure.  Continue with behavioral modification and ways to improve her stress.  Recheck in 3 months, earlier if symptomatic or blood pressure uncontrolled.  She was agreeable with the plans.  Subjective:    Patient ID: Carmen Germain is a 58 y.o. female.    MARIE Morales is here for follow-up regarding her hypertension, hypothyroidism.  She has been checking her blood pressure regularly and has been better.  Most in the 110s 130s over 80s.  She noted slightly increase in blood pressure readings in the past 1 and half weeks.  She had a reading of 139/86 and mostly related to stress.  She was out of town and when she came back, had issues with her TV, water bubble in the wall.  She gets panic with situations and feels uptight.  She has had this before.  She has been trying to work on it for a number of years.  She is trying to calm herself down by talking herself out of it.  She denies any chest pains, palpitations.  Feeling better with her headaches and less.  Denies any vision changes.  No localized numbness, weakness.  Notes that with starting the amlodipine, more ankle swelling.  Mostly at the end of the day.  She works at her desk most of the time.  She tries to elevate her legs if she can.  Denies any erythema, warmth or pain.  She has been trying to improve her salt  intake.  Occasional chips, burgers.  Exercises 4-5 times a week on her bike for around 8 minutes.  Plans to do more walking.  She continues to smoke half a pack per day.  Has 6-8 alcoholic beverage per week.    Takes her levothyroxine regularly as directed.  Denies any difficulty with swallowing or choking spells.  No palpitations.    Review of Systems  As above otherwise negative.          Objective:    Physical Exam  /80 (Patient Site: Right Arm, Patient Position: Sitting, Cuff Size: Adult Large)  Pulse 91  Wt 195 lb 11.2 oz (88.8 kg)  LMP 12/11/1993  SpO2 96%  Breastfeeding? No  BMI 36.08 kg/m2    Vital signs noted above. AAO ×3.  HEENT no nasal discharge, moist oral mucosa. Neck: Supple neck, nonpalpable cervical lymph nodes.  Lungs: Clear to auscultation bilateral.  Heart: S1-S2 regular rate and rhythm, no murmurs were noted.  Abdomen: Flabby, soft with bowel sounds and nontender.  Extremities: No leg edema, minimal bilateral ankle swelling, pulses were full and equal.  No tenderness in her ankle.  No erythema or warmth.

## 2021-06-21 NOTE — PROGRESS NOTES
Please send letter with results and this message:    Carmen, your thyroid level was normal at 1.15. You can continue the current Synthroid 112mcg dose. Your kidney function and electrolytes are normal.     Your cholesterol testing did show elevations in your total cholesterol as well as your triglycerides and LDL, which are the harmful types of cholesterol. Your calculated risk over 10 years of having a cardiovascular event (heart attack, stroke, heart failure, or otherwise), is about 13% (over that 10-year period). The current recommendations suggest that starting treatment with a cholesterol-lowering medication (i.e. Statin) is beneficial for decreasing this risk, when the calculated risk is over 7.5%. As we discussed, you are already making the recommendation choices in terms of diet and exercise. Cholesterol levels can be reduced with lifestyle modifications to lower cardiovascular disease risk. This includes eating a heart-healthy diet with more emphasis on vegetables, fruits & whole grains, regular aerobic exercises (30min-1hr daily), maintenance of desirable body weight and avoidance of tobacco products.     I recommend we discuss starting a statin medication further during a clinic visit, as soon as you are interested in scheduling this, or at your next follow up for your blood pressure in about 3-6 months.   Thanks,   Carol Liang MD

## 2021-06-24 ENCOUNTER — COMMUNICATION - HEALTHEAST (OUTPATIENT)
Dept: FAMILY MEDICINE | Facility: CLINIC | Age: 61
End: 2021-06-24

## 2021-06-24 DIAGNOSIS — E78.5 HYPERLIPIDEMIA LDL GOAL <130: ICD-10-CM

## 2021-06-25 RX ORDER — ATORVASTATIN CALCIUM 40 MG/1
40 TABLET, FILM COATED ORAL DAILY
Qty: 90 TABLET | Refills: 1 | Status: SHIPPED | OUTPATIENT
Start: 2021-06-25 | End: 2021-12-27

## 2021-06-26 NOTE — PROGRESS NOTES
Progress Notes by Carol Liang MD at 11/16/2018  2:40 PM     Author: Carol Liang MD Service: -- Author Type: Physician    Filed: 11/18/2018  1:41 PM Encounter Date: 11/16/2018 Status: Signed    : Carol Liang MD (Physician)       FEMALE PREVENTATIVE EXAM    Assessment and Plan:   59yo F here for:     1. Annual physical exam  Due for annual labs; s/p hysterectomy so no paps needed. Mammogram UTD (mother w/ h/o breast cancer at 45; declined genetic screening in the past, she is now 58 and prefers q2yr screening)  - Thyroid Stimulating Hormone (TSH)  - Lipid Cascade---> ASCVD risk is 13%; result sent to pt and recommended to pt to start statin at next visit or sooner if agrees  - Ambulatory referral for Cologaurd    2. Essential hypertension  Refilled BP meds. Recommended f/u in 3-6 mo to see more data points for her home BP meds. Checking Cr- returned normal.   - Basic Metabolic Panel  - amLODIPine (NORVASC) 10 MG tablet; Take 1 tablet (10 mg total) by mouth daily.  Dispense: 90 tablet; Refill: 3  - hydroCHLOROthiazide (HYDRODIURIL) 25 MG tablet; Take 1 tablet (25 mg total) by mouth daily.  Dispense: 90 tablet; Refill: 3  - losartan (COZAAR) 100 MG tablet; Take 1 tablet (100 mg total) by mouth daily.  Dispense: 90 tablet; Refill: 3    3. Morbid obesity (H)  Risks of obesity were discussed, including co-morbidities such as diabetes, HTN, HLD, CAD and stroke.   - encouraged moderate physical activity of 150 minutes per week  - encouraged healthy dietary choices like eating real foods, increasing protein & vegetables, and watching portion sizes.    4. Hypothyroidism, unspecified type  TSH checked and normal. Ok to continue 112mcg Synthroid daily.     5. Screen for colon cancer  - Cologaurd    6. Counseling for smoking cessation. >3 min spent in counseling for smoking cessation using motivation interviewing principles. Pt is pre-contemplative. Resources offered for when she is ready to  "make change.       Next follow up:  Return in about 3 months (around 2/16/2019) for Recheck HTN.    Immunization Review  Adult Imm Review: No immunizations due today    I discussed the following with the patient:   Adult Healthy Living: Importance of regular exercise  Healthy nutrition  Stress management    I have had an Advance Directives discussion with the patient.    Subjective:   Chief Complaint: Carmen Germain is an 58 y.o. female here for a preventative health visit.     HPI:  Establishing care.     HTN: Has been stable on her 3BP med regimen- HCTZ, losartan, amlodipine. Checks BPs at home intermittently: 135/85, 141/89 is max she has seen, but typically in the 120- low 130s systolic. Sometimes notices tingling in her hands if BPs are in the 130s systolic. No LH/dizziness.     Hypothyroidism: Was on 120mcg previously but decreased to 112mcg at last check due to low TSH in May 2018. Was to come a couple months ago for recheck but here today for physical.  Takes regularily.     Has not had colon cancer screening yet. Interested in cologaurd.       The 10-year ASCVD risk score (Precious JOY Jr., et al., 2013) is: 13.3%    Values used to calculate the score:      Age: 58 years      Sex: Female      Is Non- : No      Diabetic: No      Tobacco smoker: Yes      Systolic Blood Pressure: 138 mmHg      Is BP treated: Yes      HDL Cholesterol: 47 mg/dL      Total Cholesterol: 236 mg/dL    Healthy Habits  Are you taking a daily aspirin? No  Do you typically exercising at least 40 min, 3-4 times per week?  Yes 3x- exercise bike  Do you usually eat at least 4 servings of fruit and vegetables a day, include whole grains and fiber and avoid regularly eating high fat foods? Yes \"It could be better\"  Have you had an eye exam in the past two years? Yes  2 yrs ago  Do you see a dentist twice per year? NO  Do you have any concerns regarding sleep? No    Safety Screen  If you own firearms, are they secured in " a locked gun cabinet or with trigger locks? The patient does not own any firearms  Do you feel you are safe where you are living?: Yes (11/16/2018  2:36 PM)  Do you feel you are safe in your relationship(s)?: Yes (11/16/2018  2:36 PM)      Review of Systems:  Please see above.  The rest of the review of systems are negative for all systems.     Pap History:   Status post benign hysterectomy. Health Maintenance and Surgical History updated.  Cancer Screening       Status Date      COLONOSCOPY Overdue 2/11/2008     MAMMOGRAM Next Due 12/11/2019      Done 12/11/2017 MAMMO SCREENING BILATERAL          Patient Care Team:  Carol Liang MD as PCP - General (Family Medicine)        History     Reviewed By Date/Time Sections Reviewed    Carol Liang MD 11/18/2018  1:40 PM Social Documentation    Carol Liang MD 11/18/2018  1:38 PM Tobacco, Alcohol, Drug Use, Sexual Activity    Carol Liang MD 11/18/2018  1:37 PM Medical, Surgical, Family    Heather Torres [facility] 11/16/2018  2:34 PM Tobacco        Social History     Socioeconomic History   ? Marital status: Single     Spouse name: None   ? Number of children: None   ? Years of education: None   ? Highest education level: None   Social Needs   ? Financial resource strain: None   ? Food insecurity - worry: None   ? Food insecurity - inability: None   ? Transportation needs - medical: None   ? Transportation needs - non-medical: None   Occupational History   ? None   Tobacco Use   ? Smoking status: Current Every Day Smoker     Packs/day: 0.50   ? Smokeless tobacco: Never Used   Substance and Sexual Activity   ? Alcohol use: Yes     Comment: once a month   ? Drug use: No   ? Sexual activity: No   Other Topics Concern   ? None   Social History Narrative    . . Exercises 3x/week. She is a smoker.     Carol Liang MD           Objective:   Vital Signs:   Visit Vitals  /80 (Patient Site: Right Arm,  "Patient Position: Sitting, Cuff Size: Adult Large)   Pulse 92   Ht 5' 1.5\" (1.562 m)   Wt 198 lb 12.8 oz (90.2 kg)   LMP 12/11/1993   Breastfeeding? No   BMI 36.96 kg/m           PHYSICAL EXAM  Constitutional: Patient is oriented to person, place, and time. Patient appears well-developed and well-nourished. No distress.   Head: Normocephalic and atraumatic.   Ears: External ear and TMs normal bilaterally.  Nose: Nose normal.   Mouth/Throat: Oropharynx is clear and moist. No oropharyngeal exudate.   Eyes: Conjunctivae and EOM are normal. Pupils are equal, round, and reactive to light. No discharge. No scleral icterus.   Neck: Neck supple. No JVD present. No tracheal deviation present. No thyromegaly present.  Breasts: Normal appearing, no skin changes, no palpable mass, no tenderness on palpation.  No axillary involvement  Cardiovascular: Normal rate, regular rhythm, normal heart sounds and intact distal pulses. No murmur heard.   Pulmonary/Chest: Effort normal and breath sounds normal. Patient has no wheezes, no rales, exhibits no tenderness.   Abdominal: Soft. Bowel sounds are normal. No masses. There is no tenderness.   Lymphadenopathy:  Patient has no cervical adenopathy.   Neurological: Patient is alert and oriented to person, place, and time. Patient has normal reflexes. No cranial nerve deficit. Coordination normal.   Skin: Skin is warm and dry. No rash noted. No pallor.   Pelvic: pt declined exam  Psychiatric: Patient has good eye contact without any psychomotor retardation or stereotypic behaviors.  normal mood and affect. Judgment and thought content normal.   Speech is regular rate and rhythm.          Medication List           Accurate as of 11/16/18 11:59 PM. If you have any questions, ask your nurse or doctor.               CONTINUE taking these medications    amLODIPine 10 MG tablet  Also known as:  NORVASC  INSTRUCTIONS:  Take 1 tablet (10 mg total) by mouth daily.  Doctor's comments:  Due for office " visit before next refill (noted 10/26/2018)        cetirizine 10 MG tablet  Also known as:  ZyrTEC  INSTRUCTIONS:  Take 10 mg by mouth daily as needed.        hydroCHLOROthiazide 25 MG tablet  Also known as:  HYDRODIURIL  INSTRUCTIONS:  Take 1 tablet (25 mg total) by mouth daily.        levothyroxine 112 MCG tablet  Also known as:  SYNTHROID, LEVOTHROID  INSTRUCTIONS:  TAKE 1 TABLET(112 MCG) BY MOUTH DAILY        losartan 100 MG tablet  Also known as:  COZAAR  INSTRUCTIONS:  Take 1 tablet (100 mg total) by mouth daily.        TENSION HEADACHE 500-65 mg Tab per tablet  INSTRUCTIONS:  Take 1 tablet by mouth as needed.  Generic drug:  acetaminophen-caffeine              Where to Get Your Medications      These medications were sent to [id number] - [place], [place] - [address]  [address] [place] [address] [address], [place] [address]    Phone:  [phone]     amLODIPine 10 MG tablet    hydroCHLOROthiazide 25 MG tablet    losartan 100 MG tablet         Additional Screenings Completed Today:   Little interest or pleasure in doing things: Not at all  Feeling down, depressed, or hopeless: Several days  Trouble falling or staying asleep, or sleeping too much: Not at all  Feeling tired or having little energy: More than half the days  Poor appetite or overeating: Not at all  Feeling bad about yourself - or that you are a failure or have let yourself or your family down: Not at all  Trouble concentrating on things, such as reading the newspaper or watching television: Not at all  Moving or speaking so slowly that other people could have noticed. Or the opposite - being so fidgety or restless that you have been moving around a lot more than usual: Not at all  Thoughts that you would be better off dead, or of hurting yourself in some way: Not at all  PHQ-9 Total Score: 3  If you checked off any problems, how difficult have these problems made it for you to do your work, take care of  things at home, or get along with other people?: Somewhat difficult

## 2021-06-29 NOTE — PROGRESS NOTES
Progress Notes by Carol Liang MD at 9/4/2020 10:20 AM     Author: Carol Liang MD Service: -- Author Type: Physician    Filed: 9/4/2020  1:31 PM Encounter Date: 9/4/2020 Status: Signed    : Carol Liang MD (Physician)       FEMALE PREVENTATIVE EXAM    Assessment and Plan:     Carmen was seen today for annual exam.    Routine general medical examination at a health care facility  Due for annual labs.  We touched on her baseline anxiety which is growing a little bit with the pandemic.  She is mostly using positive thinking and I have given her some more formal structured activities through mindfulness resources.  She will follow-up if she desires to hear more about medication therapy.  Declines offer for counseling.  - s/p hysterectomy so no paps needed.   - Mammogram overdue, done 12/2017 (mother w/ h/o breast cancer at 45; pt declined genetic screening in the past, she prefers q2yr screening)  - Cologuard done 12/2018, due in 3 years  -PPSV23 due  -Shingrix advised, she will call insurance about this  - CBC  - BMP  - Thyroid Stimulating Hormone (TSH)    Hyperlipdemia  - Lipid Cascade---> ASCVD risk was 13% last year, recs to start statin at that time but pt wanted to wait. She states she has been on a statin medication in the past but cannot remember which and thinks she tolerated it for about 2 years but then was feeling unwell and not sure if this was related to uncontrolled blood pressure management before moving here.  I have advised that we check a vitamin D level to ensure that this is not low with some benefit for preventing statin myalgias with vitamin D replacement.  She is quite hesitant to want to start a statin and wants to wait for results which I anticipate will not be any different given no significant changes to her weight or dietary pattern.    Essential hypertension  Home BPs well controlled at 116s/70s. At goal <140/90. Refilled BP meds. Low salt, DASH diet  encouraged. Advised more physical activity.  - Basic Metabolic Panel  - amLODIPine (NORVASC) 10 MG tablet  - hydroCHLOROthiazide (HYDRODIURIL) 25 MG tablet  - losartan (COZAAR) 100 MG tablet; Take 1 tablet (100 mg total) by mouth daily     Morbid obesity (H)  Risks of obesity were discussed, including co-morbidities such as diabetes, HTN, HLD, CAD and stroke.   - encouraged moderate physical activity of 150 minutes per week  - encouraged healthy dietary choices like eating real foods, increasing protein & vegetables, and watching portion sizes.     Hypothyroidism, unspecified type  TSH due. Ok to continue 112mcg Synthroid daily for now.  She is going to  all of her medications before we will have lab resulted so if she needs a dose adjustment we will try to use these 112 mcg tablets for total weekly dosing.         Counseling for smoking cessation. >3 min spent in counseling for smoking cessation using motivation interviewing principles. Pt is pre-contemplative. Resources offered for when she is ready to make change.     Next follow up:  No follow-ups on file.    Immunization Review  Adult Imm Review: Due today, orders placed    I discussed the following with the patient:   Adult Healthy Living: Importance of regular exercise  Healthy nutrition  Weight loss referral options  Getting adequate sleep  Stress management  Use of seat belts  Distracted driving  Helmets  Sporting equipment safety  Firearm safety  STI prevention  Supplement use  Herbal medications/alternative medical therapies    I have had an Advance Directives discussion with the patient.    Subjective:   Chief Complaint: Carmen Germain is an 60 y.o. female here for a preventative health visit.     HPI:    Chief Complaint   Patient presents with   ? Annual Exam     fasting, no concerns       HTN: Has been stable on her 3BP med regimen- HCTZ, losartan, amlodipine.   Home BPs: 117/70, 134/82, 116/71, 128/80, 114/75, 124/80, 117/74, 126/79, 131/82  in August. Back in June she had some in higher numbers in the 130s/80; she had a lot of angst over watching the news (pandemic,rioting).   Trying to think positive.   No chest pain, shortness of breath, LE swelling, LH/dizziness.      Hypothyroidism: Was on 120mcg previously but decreased to 112mcg at last check due to low TSH in May 2018. Was to come a couple months ago for recheck but here today for physical.  Takes regularily.     Still smoking 1/2 ppd. Not interested in quitting.     Healthy Habits  Are you taking a daily aspirin? No  Do you typically exercising at least 40 min, 3-4 times per week?  Yes  Do you usually eat at least 4 servings of fruit and vegetables a day, include whole grains and fiber and avoid regularly eating high fat foods? NO 2 servings more veggies  Have you had an eye exam in the past two years? 2 years ago  Do you see a dentist twice per year? NO  Do you have any concerns regarding sleep? No    Safety Screen  If you own firearms, are they secured in a locked gun cabinet or with trigger locks? The patient does not own any firearms  No data recorded    Review of Systems:  Please see above.  The rest of the review of systems are negative for all systems.       Cancer Screening       Status Date      MAMMOGRAM Overdue 12/11/2019      Done 12/11/2017 MAMMO SCREENING BILATERAL    PAP SMEAR This plan is no longer active.           Patient Care Team:  Carol Liang MD as PCP - General (Family Medicine)  Carol Laing MD as Assigned PCP        History     Not marked as reviewed during this visit.            Objective:   Vital Signs:   Visit Vitals  [facility] 12/11/1993          PHYSICAL EXAM  Constitutional: Strongly smells of smoke. Patient is oriented to person, place, and time. Patient appears well-developed and well-nourished. No distress.   Head: Normocephalic and atraumatic.   Ears: External ear and TMs normal bilaterally.  Nose: Nose normal.   Mouth/Throat: Oropharynx is clear  and moist. No oropharyngeal exudate.   Eyes: Conjunctivae and EOM are normal. Pupils are equal, round, and reactive to light. No discharge. No scleral icterus.   Neck: Neck supple. No JVD present. No tracheal deviation present. No thyromegaly present.  Breasts: Normal appearing, no skin changes, no palpable mass, no tenderness on palpation.  No axillary involvement  Cardiovascular: Normal rate, regular rhythm, normal heart sounds and intact distal pulses. No murmur heard.   Pulmonary/Chest: Effort normal and breath sounds normal. Patient has no wheezes, no rales, exhibits no tenderness.   Abdominal: Soft. Bowel sounds are normal. No masses. There is no tenderness.   Lymphadenopathy:  Patient has no cervical adenopathy.   Neurological: Patient is alert and oriented to person, place, and time. Patient has normal reflexes. No cranial nerve deficit. Coordination normal.   Skin: Skin is warm and dry. No rash noted. No pallor.   Pelvic: not indicated based on USPSTF recommendations for asymptomatic women  Psychiatric: Patient has good eye contact without any psychomotor retardation or stereotypic behaviors.  normal mood and affect. Judgment and thought content normal.   Speech is regular rate and rhythm.       The 10-year ASCVD risk score (Precious DC Jr., et al., 2013) is: 13%    Values used to calculate the score:      Age: 60 years      Sex: Female      Is Non- : No      Diabetic: No      Tobacco smoker: Yes      Systolic Blood Pressure: 128 mmHg      Is BP treated: Yes      HDL Cholesterol: 47 mg/dL      Total Cholesterol: 236 mg/dL         Medication List          Accurate as of September 4, 2020 10:57 AM. If you have any questions, ask your nurse or doctor.            CHANGE how you take these medications    levothyroxine 112 MCG tablet  Also known as:  SYNTHROID, LEVOTHROID  INSTRUCTIONS:  Take 1 tablet (112 mcg total) by mouth Daily at 6:00 am.  What changed:  See the new instructions.  Changed  by:  Carol Liang MD           CONTINUE taking these medications    amLODIPine 10 MG tablet  Also known as:  NORVASC  INSTRUCTIONS:  Take 1 tablet (10 mg total) by mouth daily.        cetirizine 10 MG tablet  Also known as:  ZyrTEC  INSTRUCTIONS:  Take 10 mg by mouth daily as needed.        hydroCHLOROthiazide 25 MG tablet  Also known as:  HYDRODIURIL  INSTRUCTIONS:  Take 1 tablet (25 mg total) by mouth daily.        losartan 100 MG tablet  Also known as:  COZAAR  INSTRUCTIONS:  Take 1 tablet (100 mg total) by mouth daily.        Tension Headache 500-65 mg Tab per tablet  INSTRUCTIONS:  Take 1 tablet by mouth as needed.  Generic drug:  acetaminophen-caffeine              Where to Get Your Medications      These medications were sent to [id number] - [place], [place] - [address]  [address], [place] [address]    Phone:  [phone]     amLODIPine 10 MG tablet    hydroCHLOROthiazide 25 MG tablet    levothyroxine 112 MCG tablet    losartan 100 MG tablet         Additional Screenings Completed Today:

## 2021-08-14 ENCOUNTER — HEALTH MAINTENANCE LETTER (OUTPATIENT)
Age: 61
End: 2021-08-14

## 2021-08-24 DIAGNOSIS — E03.9 HYPOTHYROIDISM, UNSPECIFIED TYPE: Primary | ICD-10-CM

## 2021-08-24 DIAGNOSIS — I10 ESSENTIAL HYPERTENSION: ICD-10-CM

## 2021-08-24 DIAGNOSIS — E03.9 HYPOTHYROID: ICD-10-CM

## 2021-08-27 NOTE — TELEPHONE ENCOUNTER
"Routing refill request to provider for review/approval because:  Labs out of range:  Last potassium out of range     Last Written Prescription Date: 9/4/20  Last Fill Quantity: 90,  # refills: 3   Last office visit provider:  9/4/20    Requested Prescriptions   Pending Prescriptions Disp Refills     hydrochlorothiazide (HYDRODIURIL) 25 MG tablet [Pharmacy Med Name: HYDROCHLOROTHIAZIDE 25 MG TAB] 90 tablet 1     Sig: TAKE 1 TABLET BY MOUTH EVERY DAY       Diuretics (Including Combos) Protocol Failed - 8/24/2021 12:20 AM        Failed - Normal serum potassium on file in past 12 months     Recent Labs   Lab Test 09/04/20  1059   POTASSIUM 3.3*                    Passed - Blood pressure under 140/90 in past 12 months     BP Readings from Last 3 Encounters:   09/04/20 128/76                 Passed - Recent (12 mo) or future (30 days) visit within the authorizing provider's specialty     Patient has had an office visit with the authorizing provider or a provider within the authorizing providers department within the previous 12 mos or has a future within next 30 days. See \"Patient Info\" tab in inbasket, or \"Choose Columns\" in Meds & Orders section of the refill encounter.              Passed - Medication is active on med list        Passed - Patient is age 18 or older        Passed - No active pregancy on record        Passed - Normal serum creatinine on file in past 12 months     Recent Labs   Lab Test 09/04/20  1059   CR 0.82              Passed - Normal serum sodium on file in past 12 months     Recent Labs   Lab Test 09/04/20  1059                 Passed - No positive pregnancy test in past 12 months           losartan (COZAAR) 100 MG tablet [Pharmacy Med Name: LOSARTAN POTASSIUM 100 MG TAB] 90 tablet 1     Sig: TAKE 1 TABLET BY MOUTH EVERY DAY       Angiotensin-II Receptors Failed - 8/24/2021 12:20 AM        Failed - Normal serum potassium on file in past 12 months     Recent Labs   Lab Test 09/04/20  1059 " "  POTASSIUM 3.3*                    Passed - Last blood pressure under 140/90 in past 12 months     BP Readings from Last 3 Encounters:   09/04/20 128/76                 Passed - Recent (12 mo) or future (30 days) visit within the authorizing provider's specialty     Patient has had an office visit with the authorizing provider or a provider within the authorizing providers department within the previous 12 mos or has a future within next 30 days. See \"Patient Info\" tab in inbasket, or \"Choose Columns\" in Meds & Orders section of the refill encounter.              Passed - Medication is active on med list        Passed - Patient is age 18 or older        Passed - No active pregnancy on record        Passed - Normal serum creatinine on file in past 12 months     Recent Labs   Lab Test 09/04/20  1059   CR 0.82       Ok to refill medication if creatinine is low          Passed - No positive pregnancy test in past 12 months           amLODIPine (NORVASC) 10 MG tablet [Pharmacy Med Name: AMLODIPINE BESYLATE 10 MG TAB] 90 tablet 1     Sig: TAKE 1 TABLET BY MOUTH EVERY DAY       Calcium Channel Blockers Protocol  Passed - 8/24/2021 12:20 AM        Passed - Blood pressure under 140/90 in past 12 months     BP Readings from Last 3 Encounters:   09/04/20 128/76                 Passed - Recent (12 mo) or future (30 days) visit within the authorizing provider's specialty     Patient has had an office visit with the authorizing provider or a provider within the authorizing providers department within the previous 12 mos or has a future within next 30 days. See \"Patient Info\" tab in inbasket, or \"Choose Columns\" in Meds & Orders section of the refill encounter.              Passed - Medication is active on med list        Passed - Patient is age 18 or older        Passed - No active pregnancy on record        Passed - Normal serum creatinine on file in past 12 months     Recent Labs   Lab Test 09/04/20  1059   CR 0.82       Ok to " "refill medication if creatinine is low          Passed - No positive pregnancy test in past 12 months           levothyroxine (SYNTHROID/LEVOTHROID) 112 MCG tablet [Pharmacy Med Name: LEVOTHYROXINE 112 MCG TABLET] 90 tablet 1     Sig: TAKE 1 TABLET BY MOUTH EVERY DAY AT 6 AM       Thyroid Protocol Passed - 8/24/2021 12:20 AM        Passed - Patient is 12 years or older        Passed - Recent (12 mo) or future (30 days) visit within the authorizing provider's specialty     Patient has had an office visit with the authorizing provider or a provider within the authorizing providers department within the previous 12 mos or has a future within next 30 days. See \"Patient Info\" tab in inbasket, or \"Choose Columns\" in Meds & Orders section of the refill encounter.              Passed - Medication is active on med list        Passed - Normal TSH on file in past 12 months     Recent Labs   Lab Test 09/04/20  1059   TSH 0.95              Passed - No active pregnancy on record     If patient is pregnant or has had a positive pregnancy test, please check TSH.          Passed - No positive pregnancy test in past 12 months     If patient is pregnant or has had a positive pregnancy test, please check TSH.               Adeola lindsay RN 08/26/21 11:11 PM  "

## 2021-08-29 RX ORDER — LEVOTHYROXINE SODIUM 112 UG/1
TABLET ORAL
Qty: 90 TABLET | Refills: 0 | Status: SHIPPED | OUTPATIENT
Start: 2021-08-29 | End: 2021-11-24

## 2021-08-29 RX ORDER — LOSARTAN POTASSIUM 100 MG/1
TABLET ORAL
Qty: 90 TABLET | Refills: 0 | Status: SHIPPED | OUTPATIENT
Start: 2021-08-29 | End: 2021-11-24

## 2021-08-29 RX ORDER — HYDROCHLOROTHIAZIDE 25 MG/1
TABLET ORAL
Qty: 90 TABLET | Refills: 0 | Status: SHIPPED | OUTPATIENT
Start: 2021-08-29 | End: 2021-11-24

## 2021-08-29 RX ORDER — AMLODIPINE BESYLATE 10 MG/1
TABLET ORAL
Qty: 90 TABLET | Refills: 0 | Status: SHIPPED | OUTPATIENT
Start: 2021-08-29 | End: 2021-11-24

## 2021-10-09 ENCOUNTER — HEALTH MAINTENANCE LETTER (OUTPATIENT)
Age: 61
End: 2021-10-09

## 2021-11-22 DIAGNOSIS — E03.9 HYPOTHYROIDISM, UNSPECIFIED TYPE: ICD-10-CM

## 2021-11-22 DIAGNOSIS — I10 ESSENTIAL HYPERTENSION: ICD-10-CM

## 2021-11-24 RX ORDER — AMLODIPINE BESYLATE 10 MG/1
TABLET ORAL
Qty: 90 TABLET | Refills: 0 | Status: SHIPPED | OUTPATIENT
Start: 2021-11-24 | End: 2022-02-25

## 2021-11-24 RX ORDER — LEVOTHYROXINE SODIUM 112 UG/1
TABLET ORAL
Qty: 90 TABLET | Refills: 0 | Status: SHIPPED | OUTPATIENT
Start: 2021-11-24 | End: 2022-02-18

## 2021-11-24 RX ORDER — LOSARTAN POTASSIUM 100 MG/1
TABLET ORAL
Qty: 90 TABLET | Refills: 0 | Status: SHIPPED | OUTPATIENT
Start: 2021-11-24 | End: 2022-02-25

## 2021-11-24 RX ORDER — HYDROCHLOROTHIAZIDE 25 MG/1
TABLET ORAL
Qty: 90 TABLET | Refills: 0 | Status: SHIPPED | OUTPATIENT
Start: 2021-11-24 | End: 2022-02-25

## 2021-11-24 NOTE — TELEPHONE ENCOUNTER
"Routing refill request to provider for review/approval because:  Labs not current:  Multiple. BP overdue also  Overdue for OV.    Last Written Prescription:      Last office visit provider:  9/4/20    Requested Prescriptions   Pending Prescriptions Disp Refills     hydrochlorothiazide (HYDRODIURIL) 25 MG tablet [Pharmacy Med Name: HYDROCHLOROTHIAZIDE 25 MG TAB] 90 tablet 0     Sig: TAKE 1 TABLET BY MOUTH EVERY DAY       Diuretics (Including Combos) Protocol Failed - 11/22/2021 12:16 AM        Failed - Blood pressure under 140/90 in past 12 months     BP Readings from Last 3 Encounters:   09/04/20 128/76                 Failed - Recent (12 mo) or future (30 days) visit within the authorizing provider's specialty     Patient has had an office visit with the authorizing provider or a provider within the authorizing providers department within the previous 12 mos or has a future within next 30 days. See \"Patient Info\" tab in inbasket, or \"Choose Columns\" in Meds & Orders section of the refill encounter.              Failed - Normal serum creatinine on file in past 12 months     Recent Labs   Lab Test 09/04/20  1059   CR 0.82              Failed - Normal serum potassium on file in past 12 months     Recent Labs   Lab Test 09/04/20  1059   POTASSIUM 3.3*                    Failed - Normal serum sodium on file in past 12 months     Recent Labs   Lab Test 09/04/20  1059                 Passed - Medication is active on med list        Passed - Patient is age 18 or older        Passed - No active pregancy on record        Passed - No positive pregnancy test in past 12 months           losartan (COZAAR) 100 MG tablet [Pharmacy Med Name: LOSARTAN POTASSIUM 100 MG TAB] 90 tablet 0     Sig: TAKE 1 TABLET BY MOUTH EVERY DAY       Angiotensin-II Receptors Failed - 11/22/2021 12:16 AM        Failed - Last blood pressure under 140/90 in past 12 months     BP Readings from Last 3 Encounters:   09/04/20 128/76                 " "Failed - Recent (12 mo) or future (30 days) visit within the authorizing provider's specialty     Patient has had an office visit with the authorizing provider or a provider within the authorizing providers department within the previous 12 mos or has a future within next 30 days. See \"Patient Info\" tab in inbasket, or \"Choose Columns\" in Meds & Orders section of the refill encounter.              Failed - Normal serum creatinine on file in past 12 months     Recent Labs   Lab Test 09/04/20  1059   CR 0.82       Ok to refill medication if creatinine is low          Failed - Normal serum potassium on file in past 12 months     Recent Labs   Lab Test 09/04/20  1059   POTASSIUM 3.3*                    Passed - Medication is active on med list        Passed - Patient is age 18 or older        Passed - No active pregnancy on record        Passed - No positive pregnancy test in past 12 months           amLODIPine (NORVASC) 10 MG tablet [Pharmacy Med Name: AMLODIPINE BESYLATE 10 MG TAB] 90 tablet 0     Sig: TAKE 1 TABLET BY MOUTH EVERY DAY       Calcium Channel Blockers Protocol  Failed - 11/22/2021 12:16 AM        Failed - Blood pressure under 140/90 in past 12 months     BP Readings from Last 3 Encounters:   09/04/20 128/76                 Failed - Recent (12 mo) or future (30 days) visit within the authorizing provider's specialty     Patient has had an office visit with the authorizing provider or a provider within the authorizing providers department within the previous 12 mos or has a future within next 30 days. See \"Patient Info\" tab in inbasket, or \"Choose Columns\" in Meds & Orders section of the refill encounter.              Failed - Normal serum creatinine on file in past 12 months     Recent Labs   Lab Test 09/04/20  1059   CR 0.82       Ok to refill medication if creatinine is low          Passed - Medication is active on med list        Passed - Patient is age 18 or older        Passed - No active pregnancy " "on record        Passed - No positive pregnancy test in past 12 months           levothyroxine (SYNTHROID/LEVOTHROID) 112 MCG tablet [Pharmacy Med Name: LEVOTHYROXINE 112 MCG TABLET] 90 tablet 0     Sig: TAKE 1 TABLET BY MOUTH EVERY DAY AT 6 AM       Thyroid Protocol Failed - 11/22/2021 12:16 AM        Failed - Recent (12 mo) or future (30 days) visit within the authorizing provider's specialty     Patient has had an office visit with the authorizing provider or a provider within the authorizing providers department within the previous 12 mos or has a future within next 30 days. See \"Patient Info\" tab in inbasket, or \"Choose Columns\" in Meds & Orders section of the refill encounter.              Failed - Normal TSH on file in past 12 months     Recent Labs   Lab Test 09/04/20  1059   TSH 0.95              Passed - Patient is 12 years or older        Passed - Medication is active on med list        Passed - No active pregnancy on record     If patient is pregnant or has had a positive pregnancy test, please check TSH.          Passed - No positive pregnancy test in past 12 months     If patient is pregnant or has had a positive pregnancy test, please check TSH.               Dannielle Hernandez RN 11/23/21 6:49 PM  "

## 2022-02-17 DIAGNOSIS — E03.9 HYPOTHYROIDISM, UNSPECIFIED TYPE: ICD-10-CM

## 2022-02-18 RX ORDER — LEVOTHYROXINE SODIUM 112 UG/1
TABLET ORAL
Qty: 90 TABLET | Refills: 0 | Status: SHIPPED | OUTPATIENT
Start: 2022-02-18 | End: 2022-02-25

## 2022-02-18 NOTE — TELEPHONE ENCOUNTER
"Routing refill request to provider for review/approval because:  Labs not current:   Patient is due for visit, last visit was on 9/4/20.  Patient has been notified multiple times that is due for annual physical.      Last Written Prescription Date:    levothyroxine (SYNTHROID/LEVOTHROID) 112 MCG tablet 90 tablet 0 11/24/2021  No   Sig: TAKE 1 TABLET BY MOUTH EVERY DAY AT 6 AM   Sent to pharmacy as: Levothyroxine Sodium 112 MCG Oral Tablet (SYNTHROID/LEVOTHROID)   Class: E-Prescribe     Last office visit provider:  9/4/20     Requested Prescriptions   Pending Prescriptions Disp Refills     levothyroxine (SYNTHROID/LEVOTHROID) 112 MCG tablet [Pharmacy Med Name: LEVOTHYROXINE 112 MCG TABLET] 90 tablet 0     Sig: TAKE 1 TABLET BY MOUTH EVERY DAY AT 6 AM       Thyroid Protocol Failed - 2/17/2022 10:03 AM        Failed - Normal TSH on file in past 12 months     Recent Labs   Lab Test 09/04/20  1059   TSH 0.95              Passed - Patient is 12 years or older        Passed - Recent (12 mo) or future (30 days) visit within the authorizing provider's specialty     Patient has had an office visit with the authorizing provider or a provider within the authorizing providers department within the previous 12 mos or has a future within next 30 days. See \"Patient Info\" tab in inbasket, or \"Choose Columns\" in Meds & Orders section of the refill encounter.              Passed - Medication is active on med list        Passed - No active pregnancy on record     If patient is pregnant or has had a positive pregnancy test, please check TSH.          Passed - No positive pregnancy test in past 12 months     If patient is pregnant or has had a positive pregnancy test, please check TSH.               Carol Alan RN 02/18/22 3:23 PM  "

## 2022-02-25 ENCOUNTER — OFFICE VISIT (OUTPATIENT)
Dept: FAMILY MEDICINE | Facility: CLINIC | Age: 62
End: 2022-02-25
Payer: COMMERCIAL

## 2022-02-25 VITALS
SYSTOLIC BLOOD PRESSURE: 126 MMHG | DIASTOLIC BLOOD PRESSURE: 74 MMHG | HEIGHT: 61 IN | HEART RATE: 90 BPM | OXYGEN SATURATION: 97 % | WEIGHT: 189.38 LBS | BODY MASS INDEX: 35.75 KG/M2

## 2022-02-25 DIAGNOSIS — E66.01 MORBID OBESITY (H): ICD-10-CM

## 2022-02-25 DIAGNOSIS — E78.5 HYPERLIPIDEMIA LDL GOAL <130: ICD-10-CM

## 2022-02-25 DIAGNOSIS — Z71.6 ENCOUNTER FOR SMOKING CESSATION COUNSELING: ICD-10-CM

## 2022-02-25 DIAGNOSIS — F17.200 NICOTINE DEPENDENCE, UNCOMPLICATED, UNSPECIFIED NICOTINE PRODUCT TYPE: ICD-10-CM

## 2022-02-25 DIAGNOSIS — I10 ESSENTIAL HYPERTENSION: ICD-10-CM

## 2022-02-25 DIAGNOSIS — E03.9 HYPOTHYROIDISM, UNSPECIFIED TYPE: ICD-10-CM

## 2022-02-25 DIAGNOSIS — Z00.00 ROUTINE GENERAL MEDICAL EXAMINATION AT A HEALTH CARE FACILITY: Primary | ICD-10-CM

## 2022-02-25 DIAGNOSIS — Z12.11 SCREENING FOR COLON CANCER: ICD-10-CM

## 2022-02-25 LAB
ANION GAP SERPL CALCULATED.3IONS-SCNC: 12 MMOL/L (ref 5–18)
BUN SERPL-MCNC: 7 MG/DL (ref 8–22)
CALCIUM SERPL-MCNC: 10.1 MG/DL (ref 8.5–10.5)
CHLORIDE BLD-SCNC: 102 MMOL/L (ref 98–107)
CHOLEST SERPL-MCNC: 135 MG/DL
CO2 SERPL-SCNC: 27 MMOL/L (ref 22–31)
CREAT SERPL-MCNC: 0.81 MG/DL (ref 0.6–1.1)
ERYTHROCYTE [DISTWIDTH] IN BLOOD BY AUTOMATED COUNT: 13.5 % (ref 10–15)
FASTING STATUS PATIENT QL REPORTED: ABNORMAL
GFR SERPL CREATININE-BSD FRML MDRD: 81 ML/MIN/1.73M2
GLUCOSE BLD-MCNC: 97 MG/DL (ref 70–125)
HBA1C MFR BLD: 5.6 % (ref 0–5.6)
HCT VFR BLD AUTO: 43.2 % (ref 35–47)
HDLC SERPL-MCNC: 42 MG/DL
HGB BLD-MCNC: 14.3 G/DL (ref 11.7–15.7)
HIV 1+2 AB+HIV1 P24 AG SERPL QL IA: NEGATIVE
LDLC SERPL CALC-MCNC: 63 MG/DL
MCH RBC QN AUTO: 28.3 PG (ref 26.5–33)
MCHC RBC AUTO-ENTMCNC: 33.1 G/DL (ref 31.5–36.5)
MCV RBC AUTO: 85 FL (ref 78–100)
PLATELET # BLD AUTO: 295 10E3/UL (ref 150–450)
POTASSIUM BLD-SCNC: 3.9 MMOL/L (ref 3.5–5)
RBC # BLD AUTO: 5.06 10E6/UL (ref 3.8–5.2)
SODIUM SERPL-SCNC: 141 MMOL/L (ref 136–145)
TRIGL SERPL-MCNC: 149 MG/DL
TSH SERPL DL<=0.005 MIU/L-ACNC: 0.7 UIU/ML (ref 0.3–5)
WBC # BLD AUTO: 7 10E3/UL (ref 4–11)

## 2022-02-25 PROCEDURE — 85027 COMPLETE CBC AUTOMATED: CPT | Performed by: FAMILY MEDICINE

## 2022-02-25 PROCEDURE — 87389 HIV-1 AG W/HIV-1&-2 AB AG IA: CPT | Performed by: FAMILY MEDICINE

## 2022-02-25 PROCEDURE — 84443 ASSAY THYROID STIM HORMONE: CPT | Performed by: FAMILY MEDICINE

## 2022-02-25 PROCEDURE — 90471 IMMUNIZATION ADMIN: CPT | Performed by: FAMILY MEDICINE

## 2022-02-25 PROCEDURE — 99396 PREV VISIT EST AGE 40-64: CPT | Mod: 25 | Performed by: FAMILY MEDICINE

## 2022-02-25 PROCEDURE — 90682 RIV4 VACC RECOMBINANT DNA IM: CPT | Performed by: FAMILY MEDICINE

## 2022-02-25 PROCEDURE — 86803 HEPATITIS C AB TEST: CPT | Performed by: FAMILY MEDICINE

## 2022-02-25 PROCEDURE — 80061 LIPID PANEL: CPT | Performed by: FAMILY MEDICINE

## 2022-02-25 PROCEDURE — 36415 COLL VENOUS BLD VENIPUNCTURE: CPT | Performed by: FAMILY MEDICINE

## 2022-02-25 PROCEDURE — 99406 BEHAV CHNG SMOKING 3-10 MIN: CPT | Performed by: FAMILY MEDICINE

## 2022-02-25 PROCEDURE — 80048 BASIC METABOLIC PNL TOTAL CA: CPT | Performed by: FAMILY MEDICINE

## 2022-02-25 PROCEDURE — 83036 HEMOGLOBIN GLYCOSYLATED A1C: CPT | Performed by: FAMILY MEDICINE

## 2022-02-25 RX ORDER — LOSARTAN POTASSIUM 100 MG/1
100 TABLET ORAL DAILY
Qty: 90 TABLET | Refills: 3 | Status: SHIPPED | OUTPATIENT
Start: 2022-02-25 | End: 2023-02-16

## 2022-02-25 RX ORDER — HYDROCHLOROTHIAZIDE 25 MG/1
25 TABLET ORAL DAILY
Qty: 90 TABLET | Refills: 3 | Status: SHIPPED | OUTPATIENT
Start: 2022-02-25 | End: 2023-02-16

## 2022-02-25 RX ORDER — LEVOTHYROXINE SODIUM 112 UG/1
TABLET ORAL
Qty: 90 TABLET | Refills: 3 | Status: SHIPPED | OUTPATIENT
Start: 2022-02-25 | End: 2022-05-28

## 2022-02-25 RX ORDER — ATORVASTATIN CALCIUM 40 MG/1
40 TABLET, FILM COATED ORAL DAILY
Qty: 90 TABLET | Refills: 3 | Status: SHIPPED | OUTPATIENT
Start: 2022-02-25 | End: 2023-02-16

## 2022-02-25 RX ORDER — AMLODIPINE BESYLATE 10 MG/1
10 TABLET ORAL DAILY
Qty: 90 TABLET | Refills: 3 | Status: SHIPPED | OUTPATIENT
Start: 2022-02-25 | End: 2023-02-16

## 2022-02-25 NOTE — LETTER
March 1, 2022      Carmen Germain  [address]  [place] [address]        Dear ,    We are writing to inform you of your test results.    Your CBC (complete blood count) which looks at your hemoglobin and other blood cell types looks good- no concerns for anemia or infection.   Your glucose control as measured by the HbA1c is normal, no signs of diabetes or prediabetes. This A1c measures your average blood sugar levels over the past 3 months and is not needed to be a fasting test.     Your thyroid function as measured by the TSH was normal.     Your HIV and Hepatitis C screening was normal (negative result).     Your cholesterol levels look beautiful since starting on your atorvastatin last time.       Resulted Orders   Hemoglobin A1c   Result Value Ref Range    Hemoglobin A1C 5.6 0.0 - 5.6 %      Comment:      Normal <5.7%   Prediabetes 5.7-6.4%    Diabetes 6.5% or higher     Note: Adopted from ADA consensus guidelines.   Lipid panel reflex to direct LDL Fasting   Result Value Ref Range    Cholesterol 135 <=199 mg/dL    Triglycerides 149 <=149 mg/dL    Direct Measure HDL 42 (L) >=50 mg/dL      Comment:      HDL Cholesterol Reference Range:     0-2 years:   No reference ranges established for patients under 2 years old  at [facility] for lipid analytes.    2-8 years:  Greater than 45 mg/dL     18 years and older:   Female: Greater than or equal to 50 mg/dL   Male:   Greater than or equal to 40 mg/dL    LDL Cholesterol Calculated 63 <=129 mg/dL    Patient Fasting > 8hrs? Unknown    TSH   Result Value Ref Range    TSH 0.70 0.30 - 5.00 uIU/mL   CBC with platelets   Result Value Ref Range    WBC Count 7.0 4.0 - 11.0 10e3/uL    RBC Count 5.06 3.80 - 5.20 10e6/uL    Hemoglobin 14.3 11.7 - 15.7 g/dL    Hematocrit 43.2 35.0 - 47.0 %    MCV 85 78 - 100 fL    MCH 28.3 26.5 - 33.0 pg    MCHC 33.1 31.5 - 36.5 g/dL    RDW 13.5 10.0 - 15.0 %    Platelet Count 295 150 - 450 10e3/uL   HIV  Antigen Antibody Combo   Result Value Ref Range    HIV Antigen Antibody Combo Negative Negative   Hepatitis C antibody   Result Value Ref Range    Hepatitis C Antibody Negative Negative    Narrative    Assay performance characteristics have not been established for newborns, infants, children (<18 years) or populations of immunocompromised or immunosuppressed patients.    Basic metabolic panel  (Ca, Cl, CO2, Creat, Gluc, K, Na, BUN)   Result Value Ref Range    Sodium 141 136 - 145 mmol/L    Potassium 3.9 3.5 - 5.0 mmol/L    Chloride 102 98 - 107 mmol/L    Carbon Dioxide (CO2) 27 22 - 31 mmol/L    Anion Gap 12 5 - 18 mmol/L    Urea Nitrogen 7 (L) 8 - 22 mg/dL    Creatinine 0.81 0.60 - 1.10 mg/dL    Calcium 10.1 8.5 - 10.5 mg/dL    Glucose 97 70 - 125 mg/dL    GFR Estimate 81 >60 mL/min/1.73m2      Comment:      Effective December 21, 2021 eGFRcr in adults is calculated using the 2021 CKD-EPI creatinine equation which includes age and gender (Becky et al., NEJM, DOI: 10.1056/QVLUjt0203972)       If you have any questions or concerns, please call the clinic at the number listed above.       Sincerely,      Carol Liang MD

## 2022-02-25 NOTE — PATIENT INSTRUCTIONS
There is a new shingles vaccine that is 97% effective. It is the Shingrix vaccine and is recommended for those 50 and older. We recommend the vaccine even if you have had shingles or the older vaccine against shingles- Zostavax. Insurance coverage for the vaccine varies. I recommend you check with your insurance to verify if, when and where it is covered. The vaccine is covered by most commercial insurance but Medicare does not cover the vaccine. It may be covered by Medicare Part D (your drug/pharmacy plan) and sometimes it is covered only at a pharmacy instead of here in the clinic. If it is covered in the clinic, you may schedule a nurse visit anytime to get the first dose of the vaccine. The second dose is recommended two months after the first and should be gotten by 6 months after the first.   About 10% of people will get a sore, red reaction at the site of the injection. Some people may also feel a little sick or nauseated after the injection. This may happen with either the first and/or second vaccine.    HOW TO QUIT SMOKING  Smoking is one of the hardest habits to break. About half of all those who have ever smoked have been able to quit, and most of those (about 70%) who still smoke want to quit. Here are some of the best ways to stop smoking.     KEEP TRYING:  It takes most smokers about 8 tries before they are finally able to fully quit. So, the more often you try and fail, the better your chance of quitting the next time! So, don't give up!    GO COLD TURKEY:  Most ex-smokers quit cold turkey. Trying to cut back gradually doesn't seem to work as well, perhaps because it continues the smoking habit. Also, it is possible to fool yourself by inhaling more while smoking fewer cigarettes. This results in the same amount of nicotine in your body!    GET SUPPORT:  Support programs can make an important difference, especially for the heavy smoker. These groups offer lectures, methods to change your behavior and  peer support. Call the free national Quitline for more information. 800-QUIT-NOW (208-169-1054). Low-cost or free programs are offered by many hospitals, local chapters of the American Lung Association (995-199-2447) and the American Cancer Society (907-967-5091). Support at home is important too. Non-smokers can help by offering praise and encouragement. If the smoker fails to quit, encourage them to try again!    OVER-THE-COUNTER MEDICINES:  For those who can't quit on their own, Nicotine Replacement Therapy (NRT) may make quitting much easier. Certain aids such as the nicotine patch, gum and lozenge are available without a prescription. However, it is best to use these under the guidance of your doctor. The skin patch provides a steady supply of nicotine to the body. Nicotine gum and lozenge gives temporary bursts of low levels of nicotine. Both methods take the edge off the craving for cigarettes. WARNING: If you feel symptoms of nicotine overdose, such as nausea, vomiting, dizziness, weakness, or fast heartbeat, stop using these and see your doctor.    PRESCRIPTION MEDICINES:  After evaluating your smoking patterns and prior attempts at quitting, your doctor may offer a prescription medicine such as bupropion (Zyban, Wellbutrin), varenicline (Chantix, Champix), a niocotine inhaler or nasal spray. Each has its unique advantage and side effects which your doctor can review with you.    HEALTH BENEFITS OF QUITTING:  The benefits of quitting start right away and keep improving the longer you go without smokin minutes: blood pressure and pulse return to normal  8 hours: oxygen levels return to normal  2 days: ability to smell and taste begins to improve as damaged nerves start to regrow  2-3 weeks: circulation and lung function improves  1-9 months: decreased cough, congestion and shortness of breath; less tired  1 year: risk of heart attack decreases by half  5 years: risk of lung cancer decreases by half;  risk of stroke becomes the same as a non-smoker  For information about how to quit smoking, visit the following links:  [employer] [place] ,   Clearing the Air, Quit Smoking Today   - an online booklet. http://www.smokefree.gov/pubs/clearing_the_air.pdf  Smokefree.gov http://smokefree.gov/  QuitNet http://www.quitnet.com/    0387-8178 Buzz Mcne[employer], [address] [place] [address], [place] [address]. All rights reserved. This information is not intended as a substitute for professional medical care. Always follow your healthcare professional's instructions.

## 2022-02-25 NOTE — PROGRESS NOTES
SUBJECTIVE:   CC: Carmen Germain is an 62 year old woman who presents for preventive health visit.     Patient has been advised of split billing requirements and indicates understanding: Yes  Healthy Habits:     Getting at least 3 servings of Calcium per day:  NO    Bi-annual eye exam:  Yes    Dental care twice a year:  NO    Sleep apnea or symptoms of sleep apnea:  None    Diet:  Regular (no restrictions)    Frequency of exercise:  2-3 days/week    Duration of exercise:  Less than 15 minutes    Taking medications regularly:  Yes    Medication side effects:  None    PHQ-2 Total Score: 0    Additional concerns today:  No      Chief Complaint   Patient presents with     Physical     No questions or concerns today, fasting for labs.       Started a statin medication after our visit in 2020. No side effects.    HTN: stable on her meds; no side effects; occasional LE swelling throughout the day which improves with compression or leg elevation.   Home BPs are 125-130 systolic, diastolics 70-82 range      The 10-year ASCVD risk score (Precious HAMILTON Jr., et al., 2013) is: 11%    Values used to calculate the score:      Age: 62 years      Sex: Female      Is Non- : No      Diabetic: No      Tobacco smoker: Yes      Systolic Blood Pressure: 126 mmHg      Is BP treated: Yes      HDL Cholesterol: 42 mg/dL      Total Cholesterol: 135 mg/dL      Social History     Social History Narrative    . . Exercises 3x/week. She is a smoker for decades. A pack lasts her 1.5 days.        Her son and his  live down the [place] in the same apartment complex.    Exercises on and off at home.     Carol Liang MD         Today's PHQ-2 Score:   PHQ-2 ( 1999 Pfizer) 2/25/2022   Q1: Little interest or pleasure in doing things 0   Q2: Feeling down, depressed or hopeless 0   PHQ-2 Score 0   Q1: Little interest or pleasure in doing things Not at all   Q2: Feeling down, depressed  Interval History: No AEON.  Afebrile and WBC 15.64.  TMA done yesterday with clean margins.  Bone sent for clean margin culture.  CO of poorly controlled L foot pain. The patient denies any recent fever, chills, or sweats.      Review of Systems   Constitutional: Negative for chills, diaphoresis and fever.   Respiratory: Negative for shortness of breath.    Cardiovascular: Negative for chest pain.   Gastrointestinal: Negative for abdominal pain, diarrhea, nausea and vomiting.   Genitourinary: Negative for dysuria and hematuria.     Objective:     Vital Signs (Most Recent):  Temp: 98.4 °F (36.9 °C) (04/06/18 0716)  Pulse: (!) 121 (04/06/18 0716)  Resp: 20 (04/06/18 0716)  BP: 123/80 (04/06/18 0716)  SpO2: (!) 94 % (04/06/18 0716) Vital Signs (24h Range):  Temp:  [96.6 °F (35.9 °C)-98.8 °F (37.1 °C)] 98.4 °F (36.9 °C)  Pulse:  [] 121  Resp:  [12-20] 20  SpO2:  [94 %-100 %] 94 %  BP: ()/(44-80) 123/80     Weight: 65.8 kg (145 lb)  Body mass index is 22.71 kg/m².    Estimated Creatinine Clearance: 83.2 mL/min (based on SCr of 1 mg/dL).    Physical Exam   Constitutional: He is oriented to person, place, and time. He appears well-developed and well-nourished. No distress.       Cardiovascular: Regular rhythm.  Exam reveals no gallop and no friction rub.    No murmur heard.  tachycardic   Pulmonary/Chest: Effort normal. No respiratory distress. He has no wheezes. He has no rales.   Abdominal: Soft. He exhibits no distension. There is no tenderness. There is no rigidity and no guarding.       Musculoskeletal: He exhibits tenderness (left foot).   Neurological: He is oriented to person, place, and time.   Awake and answering questions apropriately   Skin: Skin is warm. No rash noted. He is not diaphoretic. No erythema. No pallor.   Psychiatric: He has a normal mood and affect.   Nursing note and vitals reviewed.      Significant Labs:   Blood Culture:   Recent Labs  Lab 03/20/18  2219 03/20/18  2233  "or hopeless Not at all   PHQ-2 Score 0       Abuse: Current or Past (Physical, Sexual or Emotional) - No  Do you feel safe in your environment? Yes        Social History     Tobacco Use     Smoking status: Current Every Day Smoker     Packs/day: 0.50     Smokeless tobacco: Never Used   Substance Use Topics     Alcohol use: Yes     Comment: Alcoholic Drinks/day: once a month     If you drink alcohol do you typically have >3 drinks per day or >7 drinks per week? No    Alcohol Use 2/25/2022   Prescreen: >3 drinks/day or >7 drinks/week? No   Prescreen: >3 drinks/day or >7 drinks/week? -       Reviewed orders with patient.  Reviewed health maintenance and updated orders accordingly - Yes    Breast Cancer Screening:  Any new diagnosis of family breast, ovarian, or bowel cancer? No    FHS-7:   Breast CA Risk Assessment (FHS-7) 2/25/2022   Did any of your first-degree relatives have breast or ovarian cancer? Yes   Did any of your relatives have bilateral breast cancer? No   Did any man in your family have breast cancer? No   Did any woman in your family have breast and ovarian cancer? Yes   Did any woman in your family have breast cancer before age 50 y? No   Do you have 2 or more relatives with breast and/or ovarian cancer? No   Do you have 2 or more relatives with breast and/or bowel cancer? No       Pertinent mammograms are reviewed under the imaging tab.    History of abnormal Pap smear: NO- s/p hysterectomy     Reviewed and updated as needed this visit by clinical staff   Tobacco  Allergies  Meds  Problems  Med Hx  Surg Hx  Fam Hx          Reviewed and updated as needed this visit by Provider   Tobacco  Allergies  Meds  Problems  Med Hx  Surg Hx  Fam Hx         Review of Systems  Per HPI or negative     OBJECTIVE:   /74   Pulse 90   Ht 1.556 m (5' 1.25\")   Wt 85.9 kg (189 lb 6 oz)   SpO2 97%   BMI 35.49 kg/m    Physical Exam  GENERAL: healthy, alert and no distress  EYES: Eyes grossly normal to " 03/26/18  0148 03/27/18  2148 03/27/18  2212   LABBLOO No growth after 5 days. No growth after 5 days. No growth after 5 days. No growth after 5 days. No growth after 5 days.     CBC:   Recent Labs  Lab 04/05/18  1520 04/05/18  2352 04/06/18  0532   WBC 13.39* 12.79* 15.64*   HGB 8.6* 8.3* 9.0*   HCT 26.8* 25.6* 28.1*   * 731* 797*     CMP:   Recent Labs  Lab 04/05/18  0442 04/06/18  0532    138   K 3.7 3.4*    102   CO2 25 22*   GLU 84 109   BUN 4* 6   CREATININE 0.9 1.0   CALCIUM 8.8 9.3   PROT 6.5 7.4   ALBUMIN 1.9* 2.2*   BILITOT 0.7 0.6   ALKPHOS 136* 137*   * 88*   ALT 78* 78*   ANIONGAP 9 14   EGFRNONAA >60.0 >60.0     Wound Culture: No results for input(s): LABAERO in the last 4320 hours.  All pertinent labs within the past 24 hours have been reviewed.    Significant Imaging: I have reviewed all pertinent imaging results/findings within the past 24 hours.   X-Ray Foot Complete Left [[id number]] Resulted: 04/06/18 0807   Order Status: Completed Updated: 04/06/18 0810   Narrative:     EXAMINATION:  XR FOOT COMPLETE 3 VIEW LEFT    CLINICAL HISTORY:  post op TMA;    FINDINGS:  There is complete transmetatarsal neck amputations.  No fracture dislocation bone destruction or complication seen.   Impression:       See above      Electronically signed by: Moe Chris MD  Date: 04/06/2018  Time: 08:07   X-Ray Chest 1 View for PICC_Central line [[id number]] Resulted: 03/31/18 1035   Order Status: Completed Updated: 03/31/18 1038   Narrative:     EXAMINATION:  XR CHEST 1 VIEW    CLINICAL HISTORY:  Evaluate PICC line placement;    TECHNIQUE:  Single frontal view of the chest was performed.    COMPARISON:  Chest radiograph from 03/25/2018    FINDINGS:  Interval placement of right-sided PICC line catheter with its tip in the inferior SVC.    Mild cardiomegaly.  Stable AICD.  Prominence of the pulmonary vasculature with increased interstitial lung markings suggestive of pulmonary edema, slightly  inspection, PERRL and conjunctivae and sclerae normal  HENT: ear canals and TM's normal, nose and mouth without ulcers or lesions  NECK: no adenopathy, no asymmetry, masses, or scars and thyroid normal to palpation  RESP: lungs clear to auscultation - no rales, rhonchi or wheezes  BREAST: declined  CV: regular rate and rhythm, normal S1 S2, no S3 or S4, no murmur, click or rub, no peripheral edema and peripheral pulses strong  ABDOMEN: soft, nontender, no hepatosplenomegaly, no masses and bowel sounds normal  MS: no gross musculoskeletal defects noted, no edema  SKIN: no suspicious lesions or rashes  NEURO: Normal strength and tone, mentation intact and speech normal  PSYCH: mentation appears normal, affect normal/bright    Diagnostic Test Results:  Labs reviewed in Epic    ASSESSMENT/PLAN:   Carmen was seen today for physical.    Diagnoses and all orders for this visit:    Routine general medical examination at a health care facility  Due for annual labs.    - s/p hysterectomy so no paps needed.   - Mammogram overdue, done 12/2017 (mother w/ h/o breast cancer at 45; pt declined genetic screening in the past, she prefers q2yr screening)  - Cologuard done 12/2018, due in 3 years-- will order today  - declines low dose CT lung cancer screening after risk/benefit conversation  -Shingrix advised, she will call insurance about this  - COVID vaccines UTD  - flu shot given today  - CBC  - BMP  - HIV, Hep C pt agrees to testing  - Thyroid Stimulating Hormone (TSH)     Hyperlipidemia  Lipids today are improved from last year given initiation of atorvastatin 40mg  The 10-year ASCVD risk score (Saline JOY Jr., et al., 2013) is: 11%     Essential hypertension  Home BPs well controlled at 120s/70s. At goal <130/80. Refilled BP meds. Low salt, DASH diet encouraged. Advised more physical activity.  - Basic Metabolic Panel  - amLODIPine (NORVASC) 10 MG tablet  - hydroCHLOROthiazide (HYDRODIURIL) 25 MG tablet  - losartan (COZAAR) 100 MG  worsened when compared to prior examination.  No pneumothorax.  Osseous structures are unremarkable.   Impression:       As above.      Electronically signed by: Po Armstrong MD  Date: 03/31/2018  Time: 10:35   CT Abdomen Pelvis With Contrast [[id number]] (Abnormal) Resulted: 03/30/18 2001   Order Status: Completed Updated: 03/30/18 2004   Narrative:     EXAMINATION:  CT ABDOMEN PELVIS WITH CONTRAST    CLINICAL HISTORY:  Abdominal pain, unspecified;Sepsis; Progressive distension, increasing leukocytosis, extensive PAD s/p R AKA and with necrotic L foot, other abd source?;    TECHNIQUE:  Low dose axial images, sagittal and coronal reformations were obtained from the lung bases to the pubic symphysis following the IV administration of 75 mL of Omnipaque 350    COMPARISON:  No dedicated priors    FINDINGS:  ABDOMEN:    - Lung bases: There is motion artifact which obscures the underlying pulmonary parenchyma.  Mild dependent atelectasis.  Suggestion of ground-glass pulmonary nodules is noted in the lungs with the largest possible nodule in the right lower lobe measuring 1.2 x 1.4 cm, as seen on series 2, image 7.  For a ground glass nodule 6 mm or larger, Fleischner Society 2017 guidelines recommend follow up with non-contrast chest CT at 6-12 months after discovery. If this nodule persists at that time, additional follow up with non-contrast chest CT is recommended every 2 years until 5 years of stability have been documented.    -heart: No pericardial effusion.  There is partial visualization of cardiac pacing device leads.  Moderate coronary artery atherosclerosis.  Heart is upper limits of normal in size.    - Liver: No masses.  Enhancement appears unremarkable.    - Gallbladder: Prominence without significant thickening of the gallbladder wall.  No sludge or stones noted.    - Bile Ducts: No evidence of intra or extra hepatic biliary ductal dilation.    - Spleen: Spleen and adjacent splenules noted to have an  "tablet; Take 1 tablet (100 mg total) by mouth daily     Morbid obesity (H)  Risks of obesity were discussed, including co-morbidities such as diabetes, HTN, HLD, CAD and stroke.   - encouraged moderate physical activity of 150 minutes per week  - encouraged healthy dietary choices like eating real foods, increasing protein & vegetables, and watching portion sizes.     Hypothyroidism, unspecified type  TSH due. Ok to continue 112mcg Synthroid daily for now.  S        Counseling for smoking cessation. >3 min spent in counseling for smoking cessation using motivation interviewing principles. Pt is pre-contemplative. Resources offered for when she is ready to make change.     Follow up: 6 months BP recheck or annual exam if doing well    Patient has been advised of split billing requirements and indicates understanding: Yes    COUNSELING:  Reviewed preventive health counseling, as reflected in patient instructions    Estimated body mass index is 35.49 kg/m  as calculated from the following:    Height as of this encounter: 1.556 m (5' 1.25\").    Weight as of this encounter: 85.9 kg (189 lb 6 oz).    Weight management plan: Discussed healthy diet and exercise guidelines    She reports that she has been smoking. She has been smoking about 0.50 packs per day. She has never used smokeless tobacco.  Tobacco Cessation Action Plan:   Information offered: Patient not interested at this time      Counseling Resources:  ATP IV Guidelines  Pooled Cohorts Equation Calculator  Breast Cancer Risk Calculator  BRCA-Related Cancer Risk Assessment: FHS-7 Tool  FRAX Risk Assessment  ICSI Preventive Guidelines  Dietary Guidelines for Americans, 2010  USDA's MyPlate  ASA Prophylaxis  Lung CA Screening    Carol Liang MD  [facility]  " unremarkable appearance.    - Kidneys: Prominent extrarenal pelves bilaterally.  The right ureter is somewhat more prominent than left but without evidence of obstructing ureterolith or overt hydronephrosis.    - Adrenals: Unremarkable.    - Pancreas: There is a small hypodensity about the head of the pancreas measuring 0.6 cm as seen on series 2, image 41.  This is nonspecific and may represent volume averaging, a side branch IPMN, or sequela of prior pancreatitis in this patient with a reported history of alcohol use disorder.  Otherwise pancreatic parenchyma and pancreatic duct appear unremarkable.    - Retroperitoneum:  Vascular calcifications in the kidneys bilaterally.    - Vascular: Changes of aortobifemoral bypass grafting are noted with significant mural thrombus in the aortic portion of the bypass graft.  The native aorta, common iliac, internal and external iliac arteries are entirely thrombosed and small sized.  The aortofemoral bypass graft on the right shows moderate thrombus or noncalcified atherosclerosis.    Possible severe stenosis at the right aortofemoral bypass junction, poorly evaluated secondary to exam technique.  Partially imaged stent at the proximal left femoral artery.  Correlation with other films not available in the picture archive system is recommended.    - Abdominal wall:  Mild body wall edema.    PELVIS: Urinary bladder is moderately distended without wall thickening or adjacent inflammatory change.  Prostate and seminal vesicles are within normal limits.    No evidence of small-bowel obstruction, wall thickening, or local inflammation.  Colon is unremarkable with no evidence of inflammation, wall thickening, or diverticulosis.  Rectal fat planes are preserved.  No lymphadenopathy.    BONES:  Grossly stable grade 1 anterolisthesis of L5 on S1 secondary to remote bilateral L5 pars defects.  No acute osseous abnormality and no suspicious lytic or blastic lesion.   Impression:        Bilateral ground-glass pulmonary nodules largest measuring 1.2 x 1.4 cm.  These are nonspecific and may be infectious, inflammatory, or neoplastic.  For a ground glass nodule 6 mm or larger, Fleischner Society 2017 guidelines recommend follow up with non-contrast chest CT at 6-12 months after discovery. If this nodule persists at that time, additional follow up with non-contrast chest CT is recommended every 2 years until 5 years of stability have been documented.    Changes of aortobifemoral bypass grafting noted with significant mural thrombus in the bypass graft and concern for severe stenosis at the junction of the bypass and the femoral artery on the right.  Recommend correlation with other imaging not available in Radiology picture archive system.  Native aorta and also common iliacs and branch vessels are completely thrombosed and atrophic.    Bilateral extrarenal pelves with prominence of the right greater than left ureters, without evidence of ureterolith.    Moderately distended urinary bladder without adjacent inflammatory change or associated hydronephrosis, nonspecific.  Correlate clinically.    Small hypodense focus about the head of the pancreas measuring 0.6 cm as seen on series 2, image 41.  This is nonspecific.  May represent a side branch IPMN or a pseudocyst.    Moderate coronary artery atherosclerosis, vascular calcifications in the kidney, splenules, and other incidental findings as above.    This report was flagged in Epic as abnormal.    Electronically signed by resident: Po Wong  Date: 03/30/2018  Time: 19:08    Electronically signed by: Leonardo Beasley MD  Date: 03/30/2018  Time: 20:01

## 2022-02-28 LAB — HCV AB SERPL QL IA: NEGATIVE

## 2022-03-05 ENCOUNTER — TRANSFERRED RECORDS (OUTPATIENT)
Dept: HEALTH INFORMATION MANAGEMENT | Facility: CLINIC | Age: 62
End: 2022-03-05
Payer: COMMERCIAL

## 2022-03-05 LAB — COLOGUARD-ABSTRACT: NEGATIVE

## 2022-05-24 DIAGNOSIS — I10 ESSENTIAL HYPERTENSION: ICD-10-CM

## 2022-05-25 RX ORDER — AMLODIPINE BESYLATE 10 MG/1
10 TABLET ORAL DAILY
Qty: 90 TABLET | Refills: 3 | OUTPATIENT
Start: 2022-05-25

## 2022-05-25 RX ORDER — HYDROCHLOROTHIAZIDE 25 MG/1
TABLET ORAL
Qty: 90 TABLET | Refills: 3 | OUTPATIENT
Start: 2022-05-25

## 2022-05-25 RX ORDER — LOSARTAN POTASSIUM 100 MG/1
TABLET ORAL
Qty: 90 TABLET | Refills: 3 | OUTPATIENT
Start: 2022-05-25

## 2022-05-27 DIAGNOSIS — E03.9 HYPOTHYROIDISM, UNSPECIFIED TYPE: ICD-10-CM

## 2022-05-28 RX ORDER — LEVOTHYROXINE SODIUM 112 UG/1
TABLET ORAL
Qty: 90 TABLET | Refills: 2 | Status: SHIPPED | OUTPATIENT
Start: 2022-05-28 | End: 2023-03-07

## 2022-05-28 NOTE — TELEPHONE ENCOUNTER
"Last Written Prescription Date:  2/25/22- pharmacy change  Last Fill Quantity: 90,  # refills: 3   Last office visit provider:  2/25/22     Requested Prescriptions   Pending Prescriptions Disp Refills     levothyroxine (SYNTHROID/LEVOTHROID) 112 MCG tablet [Pharmacy Med Name: LEVOTHYROXINE 112 MCG TABLET] 90 tablet 3     Sig: TAKE 1 TABLET BY MOUTH EVERY DAY AT 6 AM       Thyroid Protocol Passed - 5/27/2022 12:19 AM        Passed - Patient is 12 years or older        Passed - Recent (12 mo) or future (30 days) visit within the authorizing provider's specialty     Patient has had an office visit with the authorizing provider or a provider within the authorizing providers department within the previous 12 mos or has a future within next 30 days. See \"Patient Info\" tab in inbasket, or \"Choose Columns\" in Meds & Orders section of the refill encounter.              Passed - Medication is active on med list        Passed - Normal TSH on file in past 12 months     Recent Labs   Lab Test 02/25/22  1255   TSH 0.70              Passed - No active pregnancy on record     If patient is pregnant or has had a positive pregnancy test, please check TSH.          Passed - No positive pregnancy test in past 12 months     If patient is pregnant or has had a positive pregnancy test, please check TSH.               Lorelei Moreno RN 05/28/22 6:33 AM  "

## 2022-09-17 ENCOUNTER — HEALTH MAINTENANCE LETTER (OUTPATIENT)
Age: 62
End: 2022-09-17

## 2023-02-16 DIAGNOSIS — E78.5 HYPERLIPIDEMIA LDL GOAL <130: ICD-10-CM

## 2023-02-16 DIAGNOSIS — I10 ESSENTIAL HYPERTENSION: ICD-10-CM

## 2023-02-16 RX ORDER — HYDROCHLOROTHIAZIDE 25 MG/1
TABLET ORAL
Qty: 90 TABLET | Refills: 0 | Status: SHIPPED | OUTPATIENT
Start: 2023-02-16 | End: 2023-05-12

## 2023-02-16 RX ORDER — AMLODIPINE BESYLATE 10 MG/1
10 TABLET ORAL DAILY
Qty: 90 TABLET | Refills: 0 | Status: SHIPPED | OUTPATIENT
Start: 2023-02-16 | End: 2023-05-12

## 2023-02-16 RX ORDER — ATORVASTATIN CALCIUM 40 MG/1
TABLET, FILM COATED ORAL
Qty: 90 TABLET | Refills: 0 | Status: SHIPPED | OUTPATIENT
Start: 2023-02-16 | End: 2023-05-12

## 2023-02-16 RX ORDER — LOSARTAN POTASSIUM 100 MG/1
TABLET ORAL
Qty: 90 TABLET | Refills: 0 | Status: SHIPPED | OUTPATIENT
Start: 2023-02-16 | End: 2023-05-12

## 2023-02-16 NOTE — TELEPHONE ENCOUNTER
Prescription approved per [facility] Refill Protocol.    Pharmacy notified that last fill, appt needed for more.    EVELINE Richardson  [facility]

## 2023-02-16 NOTE — TELEPHONE ENCOUNTER
Routing refill request to provider for review/approval because:    Care team please reach out to patient to schedule appointment      EVELINE Richardson  [facility]

## 2023-05-06 ENCOUNTER — HEALTH MAINTENANCE LETTER (OUTPATIENT)
Age: 63
End: 2023-05-06

## 2023-05-11 ASSESSMENT — ACTIVITIES OF DAILY LIVING (ADL): CURRENT_FUNCTION: NO ASSISTANCE NEEDED

## 2023-05-11 ASSESSMENT — ENCOUNTER SYMPTOMS
HEMATOCHEZIA: 0
JOINT SWELLING: 0
NAUSEA: 0
DIZZINESS: 0
CONSTIPATION: 0
ARTHRALGIAS: 1
HEMATURIA: 0
PARESTHESIAS: 0
COUGH: 0
MYALGIAS: 0
EYE PAIN: 0
FREQUENCY: 0
HEARTBURN: 0
FEVER: 0
ABDOMINAL PAIN: 0
DIARRHEA: 0
SHORTNESS OF BREATH: 0
SORE THROAT: 0
HEADACHES: 0
WEAKNESS: 0
CHILLS: 0
BREAST MASS: 0
PALPITATIONS: 0
DYSURIA: 0
NERVOUS/ANXIOUS: 1

## 2023-05-12 ENCOUNTER — PATIENT OUTREACH (OUTPATIENT)
Dept: CARE COORDINATION | Facility: CLINIC | Age: 63
End: 2023-05-12

## 2023-05-12 ENCOUNTER — OFFICE VISIT (OUTPATIENT)
Dept: FAMILY MEDICINE | Facility: CLINIC | Age: 63
End: 2023-05-12
Payer: COMMERCIAL

## 2023-05-12 VITALS
HEART RATE: 89 BPM | OXYGEN SATURATION: 94 % | BODY MASS INDEX: 35.68 KG/M2 | HEIGHT: 62 IN | SYSTOLIC BLOOD PRESSURE: 138 MMHG | WEIGHT: 193.9 LBS | DIASTOLIC BLOOD PRESSURE: 70 MMHG | TEMPERATURE: 99.1 F | RESPIRATION RATE: 14 BRPM

## 2023-05-12 DIAGNOSIS — K02.9 DENTAL CARIES: ICD-10-CM

## 2023-05-12 DIAGNOSIS — J30.2 SEASONAL ALLERGIC RHINITIS, UNSPECIFIED TRIGGER: ICD-10-CM

## 2023-05-12 DIAGNOSIS — Z12.31 VISIT FOR SCREENING MAMMOGRAM: ICD-10-CM

## 2023-05-12 DIAGNOSIS — Z00.00 ROUTINE GENERAL MEDICAL EXAMINATION AT A HEALTH CARE FACILITY: Primary | ICD-10-CM

## 2023-05-12 DIAGNOSIS — Z23 NEED FOR SHINGLES VACCINE: ICD-10-CM

## 2023-05-12 DIAGNOSIS — E78.5 HYPERLIPIDEMIA LDL GOAL <130: ICD-10-CM

## 2023-05-12 DIAGNOSIS — I10 ESSENTIAL HYPERTENSION: ICD-10-CM

## 2023-05-12 DIAGNOSIS — E03.9 HYPOTHYROIDISM, UNSPECIFIED TYPE: ICD-10-CM

## 2023-05-12 DIAGNOSIS — Z13.820 SCREENING FOR OSTEOPOROSIS: ICD-10-CM

## 2023-05-12 DIAGNOSIS — E66.01 MORBID OBESITY (H): ICD-10-CM

## 2023-05-12 DIAGNOSIS — Z78.0 POST-MENOPAUSAL: ICD-10-CM

## 2023-05-12 DIAGNOSIS — Z71.6 ENCOUNTER FOR SMOKING CESSATION COUNSELING: ICD-10-CM

## 2023-05-12 LAB
ERYTHROCYTE [DISTWIDTH] IN BLOOD BY AUTOMATED COUNT: 13.7 % (ref 10–15)
HBA1C MFR BLD: 5.9 % (ref 0–5.6)
HCT VFR BLD AUTO: 42.4 % (ref 35–47)
HGB BLD-MCNC: 14.2 G/DL (ref 11.7–15.7)
MCH RBC QN AUTO: 28 PG (ref 26.5–33)
MCHC RBC AUTO-ENTMCNC: 33.5 G/DL (ref 31.5–36.5)
MCV RBC AUTO: 84 FL (ref 78–100)
PLATELET # BLD AUTO: 274 10E3/UL (ref 150–450)
RBC # BLD AUTO: 5.08 10E6/UL (ref 3.8–5.2)
WBC # BLD AUTO: 6.3 10E3/UL (ref 4–11)

## 2023-05-12 PROCEDURE — 99397 PER PM REEVAL EST PAT 65+ YR: CPT | Mod: 25 | Performed by: FAMILY MEDICINE

## 2023-05-12 PROCEDURE — 99213 OFFICE O/P EST LOW 20 MIN: CPT | Mod: 25 | Performed by: FAMILY MEDICINE

## 2023-05-12 PROCEDURE — 90471 IMMUNIZATION ADMIN: CPT | Performed by: FAMILY MEDICINE

## 2023-05-12 PROCEDURE — 85027 COMPLETE CBC AUTOMATED: CPT | Performed by: FAMILY MEDICINE

## 2023-05-12 PROCEDURE — 84443 ASSAY THYROID STIM HORMONE: CPT | Performed by: FAMILY MEDICINE

## 2023-05-12 PROCEDURE — 36415 COLL VENOUS BLD VENIPUNCTURE: CPT | Performed by: FAMILY MEDICINE

## 2023-05-12 PROCEDURE — 80048 BASIC METABOLIC PNL TOTAL CA: CPT | Performed by: FAMILY MEDICINE

## 2023-05-12 PROCEDURE — 80061 LIPID PANEL: CPT | Performed by: FAMILY MEDICINE

## 2023-05-12 PROCEDURE — 90677 PCV20 VACCINE IM: CPT | Performed by: FAMILY MEDICINE

## 2023-05-12 PROCEDURE — 83036 HEMOGLOBIN GLYCOSYLATED A1C: CPT | Performed by: FAMILY MEDICINE

## 2023-05-12 RX ORDER — LEVOTHYROXINE SODIUM 112 UG/1
112 TABLET ORAL DAILY
Qty: 90 TABLET | Refills: 3 | Status: SHIPPED | OUTPATIENT
Start: 2023-05-12 | End: 2024-04-08

## 2023-05-12 RX ORDER — LOSARTAN POTASSIUM 100 MG/1
100 TABLET ORAL DAILY
Qty: 90 TABLET | Refills: 3 | Status: SHIPPED | OUTPATIENT
Start: 2023-05-12 | End: 2024-04-08

## 2023-05-12 RX ORDER — HYDROCHLOROTHIAZIDE 25 MG/1
25 TABLET ORAL DAILY
Qty: 90 TABLET | Refills: 3 | Status: SHIPPED | OUTPATIENT
Start: 2023-05-12 | End: 2024-04-08

## 2023-05-12 RX ORDER — AMLODIPINE BESYLATE 10 MG/1
10 TABLET ORAL DAILY
Qty: 90 TABLET | Refills: 3 | Status: SHIPPED | OUTPATIENT
Start: 2023-05-12 | End: 2024-04-08

## 2023-05-12 RX ORDER — ATORVASTATIN CALCIUM 40 MG/1
40 TABLET, FILM COATED ORAL DAILY
Qty: 90 TABLET | Refills: 3 | Status: SHIPPED | OUTPATIENT
Start: 2023-05-12 | End: 2024-05-14

## 2023-05-12 RX ORDER — CETIRIZINE HYDROCHLORIDE 10 MG/1
10 TABLET ORAL DAILY PRN
Qty: 90 TABLET | Refills: 3 | Status: SHIPPED | OUTPATIENT
Start: 2023-05-12

## 2023-05-12 ASSESSMENT — ENCOUNTER SYMPTOMS
MYALGIAS: 0
HEARTBURN: 0
ABDOMINAL PAIN: 0
HEMATURIA: 0
NAUSEA: 0
PALPITATIONS: 0
FEVER: 0
WEAKNESS: 0
DIARRHEA: 0
PARESTHESIAS: 0
BREAST MASS: 0
DIZZINESS: 0
JOINT SWELLING: 0
COUGH: 0
EYE PAIN: 0
SORE THROAT: 0
HEADACHES: 0
ARTHRALGIAS: 1
NERVOUS/ANXIOUS: 1
FREQUENCY: 0
CONSTIPATION: 0
DYSURIA: 0
SHORTNESS OF BREATH: 0
HEMATOCHEZIA: 0
CHILLS: 0

## 2023-05-12 ASSESSMENT — ACTIVITIES OF DAILY LIVING (ADL): CURRENT_FUNCTION: NO ASSISTANCE NEEDED

## 2023-05-12 NOTE — LETTER
May 17, 2023      Carmen Germain  [address]  [place] [address]        Dear ,    Your potassium is a bit low. I would recommend recheck that in a couple weeks if you are feeling unwell. It is likely from your hydrochlorothiazide medication. High potassium foods include leafy green vegetables, fish, white beans, avocados, potatoes, acorn squash, milk, mushrooms, bananas, and cooked tomatoes.       Your CBC (complete blood count) which looks at your hemoglobin and other blood cell types looks good- no concerns for anemia or infection.     Your thyroid function as measured by the TSH was normal.     Your screen for diabetes does show some impaired glucose tolerance that we call prediabetes. We determine this is the case when your hemoglobin A1c (average measure of your glucose levels from the past 3 months) is between 5.7% and 6.4%. Your hemoglobin A1c value was 5.9%. We can continue to monitor this yearly. To prevent progression to diabetes, you can continue to increase physical activity to 150min per week or more, increase plant forward foods such as fresh fruits/veggies, as well as limit empty carbs and processed foods by switching to whole grain options.  If you would like to meet with a dietician, we can help place this referral.           A fish oil supplement or omega-3 fatty acids such as in fish or nuts can help lower triglycerides and improve HDL.      Resulted Orders   Basic metabolic panel   Result Value Ref Range    Sodium 141 136 - 145 mmol/L    Potassium 3.0 (L) 3.4 - 5.3 mmol/L    Chloride 102 98 - 107 mmol/L    Carbon Dioxide (CO2) 24 22 - 29 mmol/L    Anion Gap 15 7 - 15 mmol/L    Urea Nitrogen 8.8 8.0 - 23.0 mg/dL    Creatinine 0.73 0.51 - 0.95 mg/dL    Calcium 9.6 8.8 - 10.2 mg/dL    Glucose 111 (H) 70 - 99 mg/dL    GFR Estimate >90 >60 mL/min/1.73m2      Comment:      eGFR calculated using 2021 CKD-EPI equation.   CBC with platelets   Result Value Ref Range    WBC Count  6.3 4.0 - 11.0 10e3/uL    RBC Count 5.08 3.80 - 5.20 10e6/uL    Hemoglobin 14.2 11.7 - 15.7 g/dL    Hematocrit 42.4 35.0 - 47.0 %    MCV 84 78 - 100 fL    MCH 28.0 26.5 - 33.0 pg    MCHC 33.5 31.5 - 36.5 g/dL    RDW 13.7 10.0 - 15.0 %    Platelet Count 274 150 - 450 10e3/uL   TSH   Result Value Ref Range    TSH 0.41 0.30 - 4.20 uIU/mL   Lipid panel reflex to direct LDL Fasting   Result Value Ref Range    Cholesterol 126 <200 mg/dL    Triglycerides 155 (H) <150 mg/dL    Direct Measure HDL 44 (L) >=50 mg/dL    LDL Cholesterol Calculated 51 <=100 mg/dL    Non HDL Cholesterol 82 <130 mg/dL    Narrative    Cholesterol  Desirable:  <200 mg/dL    Triglycerides  Normal:  Less than 150 mg/dL  Borderline High:  150-199 mg/dL  High:  200-499 mg/dL  Very High:  Greater than or equal to 500 mg/dL    Direct Measure HDL  Female:  Greater than or equal to 50 mg/dL   Male:  Greater than or equal to 40 mg/dL    LDL Cholesterol  Desirable:  <100mg/dL  Above Desirable:  100-129 mg/dL   Borderline High:  130-159 mg/dL   High:  160-189 mg/dL   Very High:  >= 190 mg/dL    Non HDL Cholesterol  Desirable:  130 mg/dL  Above Desirable:  130-159 mg/dL  Borderline High:  160-189 mg/dL  High:  190-219 mg/dL  Very High:  Greater than or equal to 220 mg/dL   Hemoglobin A1c   Result Value Ref Range    Hemoglobin A1C 5.9 (H) 0.0 - 5.6 %      Comment:      Normal <5.7%   Prediabetes 5.7-6.4%    Diabetes 6.5% or higher     Note: Adopted from ADA consensus guidelines.       If you have any questions or concerns, please call the clinic at the number listed above.       Sincerely,      Carol Liang MD

## 2023-05-12 NOTE — PROGRESS NOTES
"   SUBJECTIVE:   CC: Carmen is an 63 year old who presents for preventive health visit.       5/12/2023    11:34 AM   Additional Questions   Roomed by DANIEL Valentine   Accompanied by N/a         5/12/2023    11:34 AM   Patient Reported Additional Medications   Patient reports taking the following new medications n/a   Patient has been advised of split billing requirements and indicates understanding: Yes  Healthy Habits:     In general, how would you rate your overall health?  Good    Frequency of exercise:  2-3 days/week    Duration of exercise:  15-30 minutes    Do you usually eat at least 4 servings of fruit and vegetables a day, include whole grains    & fiber and avoid regularly eating high fat or \"junk\" foods?  No    Taking medications regularly:  Yes    Medication side effects:  No muscle aches and No significant flushing    Ability to successfully perform activities of daily living:  No assistance needed    Home Safety:  No safety concerns identified    Hearing Impairment:  No hearing concerns    In the past 6 months, have you been bothered by leaking of urine?  No    In general, how would you rate your overall mental or emotional health?  Good      PHQ-2 Total Score: 0    Additional concerns today:  No    Chief Complaint   Patient presents with     Physical     HTN: stable on her meds (amlodipine 10mg, hydrochlorothiazide 25mg, losartan 100mg); no side effects; occasional LE swelling throughout the day which improves with compression or leg elevation.   Home BPs are 125-130 systolic, diastolics 70-82 range    HLD: tolerating atorvastatin  40mg daily.     Hypothyroidism: on levothyroxine 112mg daily- feeling pretty stable. Has 3-4month windows of good exercise patterns (stationary bike, stretching).     Allergies, on zryte, wondering about an Rx for this to see if insurance covers better    She is thinking about moving to an apartment in [place] which is when she would retire.     Overdue for her mammogram; " going to schedule this yet.    She takes Vitamin D3 sporadically; yogurt and other dietary sources for calcium    Social History     Social History Narrative    . . Exercises 3x/week. She is a smoker for decades. A pack lasts her 1.5 days.        Her son and his  live down the [place] in the same apartment complex.    Exercises on and off at home.     Carol Liang MD           Today's PHQ-2 Score:       5/11/2023     4:34 PM   PHQ-2 ( 1999 [employer])   Q1: Little interest or pleasure in doing things 0   Q2: Feeling down, depressed or hopeless 0   PHQ-2 Score 0   Q1: Little interest or pleasure in doing things Not at all    Not at all   Q2: Feeling down, depressed or hopeless Not at all    Not at all   PHQ-2 Score 0    0           Social History     Tobacco Use     Smoking status: Every Day     Packs/day: 0.50     Types: Cigarettes     Smokeless tobacco: Never   Vaping Use     Vaping status: Not on file   Substance Use Topics     Alcohol use: Yes     Comment: Alcoholic Drinks/day: once a month           5/11/2023     4:34 PM   Alcohol Use   Prescreen: >3 drinks/day or >7 drinks/week? No     Reviewed orders with patient.  Reviewed health maintenance and updated orders accordingly - Yes    Breast Cancer Screening:    FHS-7:       2/25/2022    11:49 AM 5/11/2023     4:36 PM   Breast CA Risk Assessment (FHS-7)   Did any of your first-degree relatives have breast or ovarian cancer? Yes Yes   Did any of your relatives have bilateral breast cancer? No No   Did any man in your family have breast cancer? No No   Did any woman in your family have breast and ovarian cancer? Yes Yes   Did any woman in your family have breast cancer before age 50 y? No Yes   Do you have 2 or more relatives with breast and/or ovarian cancer? No No   Do you have 2 or more relatives with breast and/or bowel cancer? No No       Pertinent mammograms are reviewed under the imaging tab.    History of  "abnormal Pap smear: Status post benign hysterectomy. Health Maintenance and Surgical History updated.     Reviewed and updated as needed this visit by clinical staff   Tobacco  Allergies  Meds              Reviewed and updated as needed this visit by Provider                     Review of Systems   Constitutional: Negative for chills and fever.   HENT: Negative for congestion, ear pain, hearing loss and sore throat.    Eyes: Negative for pain and visual disturbance.   Respiratory: Negative for cough and shortness of breath.    Cardiovascular: Positive for peripheral edema. Negative for chest pain and palpitations.   Gastrointestinal: Negative for abdominal pain, constipation, diarrhea, heartburn, hematochezia and nausea.   Breasts:  Negative for tenderness, breast mass and discharge.   Genitourinary: Negative for dysuria, frequency, genital sores, hematuria, pelvic pain, urgency, vaginal bleeding and vaginal discharge.   Musculoskeletal: Positive for arthralgias. Negative for joint swelling and myalgias.   Skin: Negative for rash.   Neurological: Negative for dizziness, weakness, headaches and paresthesias.   Psychiatric/Behavioral: Negative for mood changes. The patient is nervous/anxious.         OBJECTIVE:   /70 (BP Location: Left arm, Patient Position: Sitting, Cuff Size: Adult Large)   Pulse 89   Temp 99.1  F (37.3  C) (Oral)   Resp 14   Ht 1.568 m (5' 1.75\")   Wt 88 kg (193 lb 14.4 oz)   SpO2 94%   BMI 35.75 kg/m    Physical Exam  GENERAL: healthy, alert and no distress  EYES: Eyes grossly normal to inspection, PERRL and conjunctivae and sclerae normal  HENT: ear canals and TM's normal, nose and mouth without ulcers or lesions but significant dental caries and gingivitis noted  NECK: no adenopathy, no asymmetry, masses, or scars and thyroid normal to palpation  RESP: lungs clear to auscultation - no rales, rhonchi or wheezes  BREAST: normal without masses, tenderness or nipple discharge and no " "palpable axillary masses or adenopathy  CV: regular rate and rhythm, normal S1 S2, no S3 or S4, no murmur, click or rub, no peripheral edema and peripheral pulses strong  ABDOMEN: soft, nontender, no hepatosplenomegaly, no masses and bowel sounds normal  MS: no gross musculoskeletal defects noted, no edema  SKIN: no suspicious lesions or rashes  NEURO: Normal strength and tone, mentation intact and speech normal  PSYCH: mentation appears normal, affect normal/bright    Diagnostic Test Results:  Labs reviewed in Epic    ASSESSMENT/PLAN:   Carmen was seen today for physical.    Diagnoses and all orders for this visit:    Routine general medical examination at a health care facility  -     Basic metabolic panel; Future  -     CBC with platelets; Future  -     TSH; Future  -     Lipid panel reflex to direct LDL Fasting; Future  -     Hemoglobin A1c; Future    Visit for screening mammogram  -     MA SCREENING DIGITAL BILAT - Future  (s+30); Future    Obesity (BMI 35.0-39.9) with comorbidity (H)  Risks of obesity were discussed, including co-morbidities such as diabetes, HTN, HLD, CAD and stroke.   - encouraged moderate physical activity of 150 minutes per week  - encouraged healthy dietary choices like eating real foods, increasing protein & vegetables, and watching portion sizes.  Reviewed additional resources today including new book by author Dr. Corry Bolaños \"How to Loose Weight for the Last Time: Brain Based Solutions for Permanent Weight Loss\" and her podcast (techinically geared for physicians but she has an evidence based approach for sustained weight loss with strategies all individuals can benefit from and we reviewed the nuances of how to navigate these podcasts together).   - A1c, TSH    Essential hypertension  BPs well controlled on home monitoring. Continue 3 med regimen; refills sent. BMP due today. Encouraged weight loss, low salt diet.   -     amLODIPine (NORVASC) 10 MG tablet; Take 1 tablet (10 mg) by " mouth daily  -     hydrochlorothiazide (HYDRODIURIL) 25 MG tablet; Take 1 tablet (25 mg) by mouth daily  -     losartan (COZAAR) 100 MG tablet; Take 1 tablet (100 mg) by mouth daily  -     Primary Care - Care Coordination Referral; Future    Hypothyroidism, unspecified type  -     TSH; Future  -     levothyroxine (SYNTHROID/LEVOTHROID) 112 MCG tablet; Take 1 tablet (112 mcg) by mouth daily    Smoking cessation counseling- precontemplative  I spent >5min in counseling regarding benefits of smoking cessation, using motivational interviewing approach. Pt is in the pre-contemplative stage and declines resources for smoking cessation.     Hyperlipidemia LDL goal <130  Stable, recheck due today  -     Lipid panel reflex to direct LDL Fasting; Future  -     atorvastatin (LIPITOR) 40 MG tablet; Take 1 tablet (40 mg) by mouth daily    Post-menopausal  Screening for osteoporosis  -     DEXA HIP/PELVIS/SPINE - Future; Future    Seasonal allergic rhinitis, unspecified trigger  -     cetirizine (ZYRTEC) 10 MG tablet; Take 1 tablet (10 mg) by mouth daily as needed    Dental caries  Has struggled to access dental care due to cost.  We did strongly encourage to check in with her insurance to see if they cover any routine cleanings because at a minimum that would be better than nothing.  Encourage fluoride based toothpaste with gentle brushing and gum health.  She is encouraged to quit smoking and understands that is playing a huge role in her dental health.  She is open to hearing from our care coordination team regarding any resources for low income dental services or any free dental clinics of these are available.  -     Primary Care - Care Coordination Referral; Future    Need for shingles vaccine  Currently declines but standing order will be placed.  Encouraged her to call insurance    Other orders  -     REVIEW OF HEALTH MAINTENANCE PROTOCOL ORDERS  -     Pneumococcal 20 Valent Conjugate (PCV20)  -     ZOSTER VACCINE  "RECOMBINANT ADJUVANTED (SHINGRIX); Standing        Patient has been advised of split billing requirements and indicates understanding: Yes      COUNSELING:  Reviewed preventive health counseling, as reflected in patient instructions      BMI:   Estimated body mass index is 35.75 kg/m  as calculated from the following:    Height as of this encounter: 1.568 m (5' 1.75\").    Weight as of this encounter: 88 kg (193 lb 14.4 oz).   Weight management plan: Discussed healthy diet and exercise guidelines      She reports that she has been smoking cigarettes. She has been smoking an average of .5 packs per day. She has never used smokeless tobacco.  Nicotine/Tobacco Cessation Plan:   Information offered: Patient not interested at this time      Carol Liang MD  [facility]  "

## 2023-05-12 NOTE — PATIENT INSTRUCTIONS
"There is a new shingles vaccine that is 97% effective. It is the Shingrix vaccine and is recommended for those 50 and older. We recommend the vaccine even if you have had shingles or the older vaccine against shingles- Zostavax. Insurance coverage for the vaccine varies. I recommend you check with your insurance to verify if, when and where it is covered. The vaccine is covered by most commercial insurance but Medicare does not cover the vaccine. It may be covered by Medicare Part D (your drug/pharmacy plan) and sometimes it is covered only at a pharmacy instead of here in the clinic. If it is covered in the clinic, you may schedule a nurse visit anytime to get the first dose of the vaccine. The second dose is recommended two months after the first and should be gotten by 6 months after the first.   About 10% of people will get a sore, red reaction at the site of the injection. Some people may also feel a little sick or nauseated after the injection. This may happen with either the first and/or second vaccine.      Dr. Corry Bolaños's book for everyone:  \"How to Loose Weight for the Last Time: Brain Based Solutions for Permanent Weight Loss\"   Also her Podcast: Weight loss for Busy Physicians *   *just insert your own career/barriers with any reference to busy physician world and you will still get phenomenal tips for the mindset changes needed for sustained weight loss.        Weight Loss Strategies for Success: (start small, pick 1 or 2 goals each week)    Stop snacking. Three meals a day only. This is THE MAJOR  for insulin resistance. We need to change the insulin resistance to have sustained weight loss.   Keep a food log- apps like [employer] are very helpful for this. (Not to pay attention to the calories but just to track the food).   Plan your food log in advance- Night prior: pick out what you want to eat for the next day for your 3 meals and stick to it: protein, healthy fat, veggie for example at " "every meal.  High protein (60g/day): Try to get protein in at least every 3.5 hours during the day to avoid a hunger surge late in the day.  If hungry, start with a protein serving, followed by water and then a crunchy vegetable that requires chewing (this decreases the hunger hormone).   Use plenty of healthy fats (olive oil, avocados, nuts) when cooking which will make you feel more satisfied.    Be intentional and think about what you are eating and feel the food fuel you.  Have a  \"Table, chair, plate\" with every meal. Sit down to eat your food!  Brush your teeth after the last meal of the day. Prevents evening snacking.   Avoid sugary beverages. (pop, fancy coffees). Reduce alcohol.   Drink water instead.   No fast food (or reduce meals out to a specific #/week). Eat at home 90% of the time.  Start the morning with breakfast.  Choose one day to be \"meal planning\" and/or \"meal prep\" days. Plan ahead for grocery shopping for healthy food choices, and spend 30min-1hr each week prepping your fruits/veggies (wash, chop, store in easy grab portions).  Make it easy to grab the protein (cut up chicken breasts, greek yogurt containers, hard boiled eggs, etc)  Start low intensity exercise 30 minutes/day (walking/hiking/yard work).  Goal for 10,000 steps per day (consider a FitBit or other tracking device).  Daily weights can help- but just use it as a data point; don't get stuck in the thoughts around that number.     "

## 2023-05-12 NOTE — PROGRESS NOTES
Clinic Care Coordination Contact  [facility]/Voicemail       Clinical Data: Care Coordinator Outreach  Outreach attempted x 1.  Left message on patient's voicemail with call back information and requested return call.  Plan: Care Coordinator CHW to discuss recent CC referral and provide dental resources  Care Coordinator will try to reach patient again in 1-2 business days= 5/15/2023    Brenda MARIA  Community Health Worker  [facility] Care Coordination  [facility] CenterMarichuy [email or web address]  Office: [phone]

## 2023-05-13 LAB
CHOLEST SERPL-MCNC: 126 MG/DL
HDLC SERPL-MCNC: 44 MG/DL
LDLC SERPL CALC-MCNC: 51 MG/DL
NONHDLC SERPL-MCNC: 82 MG/DL
TRIGL SERPL-MCNC: 155 MG/DL
TSH SERPL DL<=0.005 MIU/L-ACNC: 0.41 UIU/ML (ref 0.3–4.2)

## 2023-05-14 LAB
ANION GAP SERPL CALCULATED.3IONS-SCNC: 15 MMOL/L (ref 7–15)
BUN SERPL-MCNC: 8.8 MG/DL (ref 8–23)
CALCIUM SERPL-MCNC: 9.6 MG/DL (ref 8.8–10.2)
CHLORIDE SERPL-SCNC: 102 MMOL/L (ref 98–107)
CREAT SERPL-MCNC: 0.73 MG/DL (ref 0.51–0.95)
DEPRECATED HCO3 PLAS-SCNC: 24 MMOL/L (ref 22–29)
GFR SERPL CREATININE-BSD FRML MDRD: >90 ML/MIN/1.73M2
GLUCOSE SERPL-MCNC: 111 MG/DL (ref 70–99)
POTASSIUM SERPL-SCNC: 3 MMOL/L (ref 3.4–5.3)
SODIUM SERPL-SCNC: 141 MMOL/L (ref 136–145)

## 2023-05-15 ENCOUNTER — PATIENT OUTREACH (OUTPATIENT)
Dept: CARE COORDINATION | Facility: CLINIC | Age: 63
End: 2023-05-15
Payer: COMMERCIAL

## 2023-05-15 NOTE — PROGRESS NOTES
Clinic Care Coordination Contact  [facility]/Voicemail    Clinical Data: Care Coordinator Outreach  Outreach attempted x 2.  Left message on patient's voicemail with call back information and requested return call.    Plan: Care Coordinator will send care coordination introduction letter with care coordinator contact information and explanation of care coordination services via [employer]. Care Coordinator will do no further outreaches at this time.    ** CHW also sent Dental resources with CCC Introduction letter.     Kiesha Muse  formerly [facility] Health Worker  [facility] Care Coordination   [place], [place] [facility]  Office: [phone]

## 2023-06-05 ENCOUNTER — ANCILLARY PROCEDURE (OUTPATIENT)
Dept: MAMMOGRAPHY | Facility: HOSPITAL | Age: 63
End: 2023-06-05
Attending: FAMILY MEDICINE
Payer: COMMERCIAL

## 2023-06-05 DIAGNOSIS — Z12.31 VISIT FOR SCREENING MAMMOGRAM: ICD-10-CM

## 2023-06-05 PROCEDURE — 77067 SCR MAMMO BI INCL CAD: CPT

## 2024-04-06 DIAGNOSIS — E03.9 HYPOTHYROIDISM, UNSPECIFIED TYPE: ICD-10-CM

## 2024-04-06 DIAGNOSIS — I10 ESSENTIAL HYPERTENSION: ICD-10-CM

## 2024-04-08 RX ORDER — AMLODIPINE BESYLATE 10 MG/1
10 TABLET ORAL DAILY
Qty: 90 TABLET | Refills: 1 | Status: SHIPPED | OUTPATIENT
Start: 2024-04-08

## 2024-04-08 RX ORDER — HYDROCHLOROTHIAZIDE 25 MG/1
25 TABLET ORAL DAILY
Qty: 90 TABLET | Refills: 1 | Status: SHIPPED | OUTPATIENT
Start: 2024-04-08

## 2024-04-08 RX ORDER — LOSARTAN POTASSIUM 100 MG/1
100 TABLET ORAL DAILY
Qty: 90 TABLET | Refills: 1 | Status: SHIPPED | OUTPATIENT
Start: 2024-04-08

## 2024-04-08 RX ORDER — LEVOTHYROXINE SODIUM 112 UG/1
112 TABLET ORAL DAILY
Qty: 90 TABLET | Refills: 1 | Status: SHIPPED | OUTPATIENT
Start: 2024-04-08

## 2024-05-14 DIAGNOSIS — E78.5 HYPERLIPIDEMIA LDL GOAL <130: ICD-10-CM

## 2024-05-14 RX ORDER — ATORVASTATIN CALCIUM 40 MG/1
40 TABLET, FILM COATED ORAL DAILY
Qty: 90 TABLET | Refills: 0 | Status: SHIPPED | OUTPATIENT
Start: 2024-05-14 | End: 2024-08-14

## 2024-05-15 NOTE — TELEPHONE ENCOUNTER
Patient Needing Appt for Med Refills   Requested provider: Carol Liang  Comments: Patient is needing an appointment/preventative visit, in order for medication atorvastatin to be refilled, requested for refill sent in by pt on 05/14/2024. If patient returns call, please help schedule them for appt, other providers OK.

## 2024-05-17 NOTE — TELEPHONE ENCOUNTER
05/17/24  LM for pt to schedule an appt. And sent [employer] message.  Pt has been contacted 3x via phone and 1x via [employer] with no appt scheduled.  Ayala

## 2024-07-13 ENCOUNTER — HEALTH MAINTENANCE LETTER (OUTPATIENT)
Age: 64
End: 2024-07-13

## 2024-08-04 NOTE — TELEPHONE ENCOUNTER
Diagnostic wire removed. RN cannot approve Refill Request    RN can NOT refill this medication PCP messaged that patient is overdue for Labs and Office Visit. Last office visit: Visit date not found Last Physical: 11/16/2018 Last MTM visit: Visit date not found Last visit same specialty: 5/18/2018 Ebony Cali MD.  Next visit within 3 mo: Visit date not found  Next physical within 3 mo: Visit date not found      Angie Mart, Care Connection Triage/Med Refill 7/3/2020    Requested Prescriptions   Pending Prescriptions Disp Refills     hydroCHLOROthiazide (HYDRODIURIL) 25 MG tablet [Pharmacy Med Name: HYDROCHLOROTHIAZIDE 25MG TABLETS] 30 tablet 0     Sig: TAKE 1 TABLET(25 MG) BY MOUTH DAILY       Diuretics/Combination Diuretics Refill Protocol  Failed - 7/2/2020  6:44 AM        Failed - Visit with PCP or prescribing provider visit in past 12 months     Last office visit with prescriber/PCP: Visit date not found OR same dept: Visit date not found OR same specialty: 5/18/2018 Ebony Cali MD  Last physical: 11/16/2018 Last MTM visit: Visit date not found   Next visit within 3 mo: Visit date not found  Next physical within 3 mo: Visit date not found  Prescriber OR PCP: Carol Liang MD  Last diagnosis associated with med order: 1. Essential hypertension  - hydroCHLOROthiazide (HYDRODIURIL) 25 MG tablet [Pharmacy Med Name: HYDROCHLOROTHIAZIDE 25MG TABLETS]; TAKE 1 TABLET(25 MG) BY MOUTH DAILY  Dispense: 30 tablet; Refill: 0  - amLODIPine (NORVASC) 10 MG tablet [Pharmacy Med Name: AMLODIPINE BESYLATE 10MG TABLETS]; TAKE 1 TABLET(10 MG) BY MOUTH DAILY  Dispense: 30 tablet; Refill: 0  - losartan (COZAAR) 100 MG tablet [Pharmacy Med Name: LOSARTAN 100MG TABLETS]; TAKE 1 TABLET(100 MG) BY MOUTH DAILY  Dispense: 30 tablet; Refill: 0    2. Hypothyroid  - levothyroxine (SYNTHROID, LEVOTHROID) 112 MCG tablet [Pharmacy Med Name: LEVOTHYROXINE 0.112MG (112MCG) TABS]; TAKE 1 TABLET(112 MCG) BY MOUTH DAILY  Dispense: 30 tablet;  Refill: 0    If protocol passes may refill for 12 months if within 3 months of last provider visit (or a total of 15 months).             Failed - Serum Potassium in past 12 months      No results found for: LN-POTASSIUM          Failed - Serum Sodium in past 12 months      No results found for: LN-SODIUM          Failed - Blood pressure on file in past 12 months     BP Readings from Last 1 Encounters:   11/16/18 138/80             Failed - Serum Creatinine in past 12 months      Creatinine   Date Value Ref Range Status   11/16/2018 0.74 0.60 - 1.10 mg/dL Final                amLODIPine (NORVASC) 10 MG tablet [Pharmacy Med Name: AMLODIPINE BESYLATE 10MG TABLETS] 30 tablet 0     Sig: TAKE 1 TABLET(10 MG) BY MOUTH DAILY       Calcium-Channel Blockers Protocol Failed - 7/2/2020  6:44 AM        Failed - PCP or prescribing provider visit in past 12 months or next 3 months     Last office visit with prescriber/PCP: Visit date not found OR same dept: Visit date not found OR same specialty: 5/18/2018 Ebony Cali MD  Last physical: 11/16/2018 Last MTM visit: Visit date not found   Next visit within 3 mo: Visit date not found  Next physical within 3 mo: Visit date not found  Prescriber OR PCP: Carol Liang MD  Last diagnosis associated with med order: 1. Essential hypertension  - hydroCHLOROthiazide (HYDRODIURIL) 25 MG tablet [Pharmacy Med Name: HYDROCHLOROTHIAZIDE 25MG TABLETS]; TAKE 1 TABLET(25 MG) BY MOUTH DAILY  Dispense: 30 tablet; Refill: 0  - amLODIPine (NORVASC) 10 MG tablet [Pharmacy Med Name: AMLODIPINE BESYLATE 10MG TABLETS]; TAKE 1 TABLET(10 MG) BY MOUTH DAILY  Dispense: 30 tablet; Refill: 0  - losartan (COZAAR) 100 MG tablet [Pharmacy Med Name: LOSARTAN 100MG TABLETS]; TAKE 1 TABLET(100 MG) BY MOUTH DAILY  Dispense: 30 tablet; Refill: 0    2. Hypothyroid  - levothyroxine (SYNTHROID, LEVOTHROID) 112 MCG tablet [Pharmacy Med Name: LEVOTHYROXINE 0.112MG (112MCG) TABS]; TAKE 1 TABLET(112 MCG) BY  MOUTH DAILY  Dispense: 30 tablet; Refill: 0    If protocol passes may refill for 12 months if within 3 months of last provider visit (or a total of 15 months).             Failed - Blood pressure filed in past 12 months     BP Readings from Last 1 Encounters:   11/16/18 138/80                levothyroxine (SYNTHROID, LEVOTHROID) 112 MCG tablet [Pharmacy Med Name: LEVOTHYROXINE 0.112MG (112MCG) TABS] 30 tablet 0     Sig: TAKE 1 TABLET(112 MCG) BY MOUTH DAILY       Thyroid Hormones Protocol Failed - 7/2/2020  6:44 AM        Failed - Provider visit in past 12 months or next 3 months     Last office visit with prescriber/PCP: Visit date not found OR same dept: Visit date not found OR same specialty: 5/18/2018 Ebony Cali MD  Last physical: 11/16/2018 Last MTM visit: Visit date not found   Next visit within 3 mo: Visit date not found  Next physical within 3 mo: Visit date not found  Prescriber OR PCP: Carol Liang MD  Last diagnosis associated with med order: 1. Essential hypertension  - hydroCHLOROthiazide (HYDRODIURIL) 25 MG tablet [Pharmacy Med Name: HYDROCHLOROTHIAZIDE 25MG TABLETS]; TAKE 1 TABLET(25 MG) BY MOUTH DAILY  Dispense: 30 tablet; Refill: 0  - amLODIPine (NORVASC) 10 MG tablet [Pharmacy Med Name: AMLODIPINE BESYLATE 10MG TABLETS]; TAKE 1 TABLET(10 MG) BY MOUTH DAILY  Dispense: 30 tablet; Refill: 0  - losartan (COZAAR) 100 MG tablet [Pharmacy Med Name: LOSARTAN 100MG TABLETS]; TAKE 1 TABLET(100 MG) BY MOUTH DAILY  Dispense: 30 tablet; Refill: 0    2. Hypothyroid  - levothyroxine (SYNTHROID, LEVOTHROID) 112 MCG tablet [Pharmacy Med Name: LEVOTHYROXINE 0.112MG (112MCG) TABS]; TAKE 1 TABLET(112 MCG) BY MOUTH DAILY  Dispense: 30 tablet; Refill: 0    If protocol passes may refill for 12 months if within 3 months of last provider visit (or a total of 15 months).             Failed - TSH on file in past 12 months for patient age 12 & older     TSH   Date Value Ref Range Status   11/16/2018 1.15  0.30 - 5.00 uIU/mL Final                      losartan (COZAAR) 100 MG tablet [Pharmacy Med Name: LOSARTAN 100MG TABLETS] 30 tablet 0     Sig: TAKE 1 TABLET(100 MG) BY MOUTH DAILY       Angiotensin Receptor Blocker Protocol Failed - 7/2/2020  6:44 AM        Failed - PCP or prescribing provider visit in past 12 months       Last office visit with prescriber/PCP: Visit date not found OR same dept: Visit date not found OR same specialty: 5/18/2018 Ebony Cali MD  Last physical: 11/16/2018 Last MTM visit: Visit date not found   Next visit within 3 mo: Visit date not found  Next physical within 3 mo: Visit date not found  Prescriber OR PCP: Carol Liang MD  Last diagnosis associated with med order: 1. Essential hypertension  - hydroCHLOROthiazide (HYDRODIURIL) 25 MG tablet [Pharmacy Med Name: HYDROCHLOROTHIAZIDE 25MG TABLETS]; TAKE 1 TABLET(25 MG) BY MOUTH DAILY  Dispense: 30 tablet; Refill: 0  - amLODIPine (NORVASC) 10 MG tablet [Pharmacy Med Name: AMLODIPINE BESYLATE 10MG TABLETS]; TAKE 1 TABLET(10 MG) BY MOUTH DAILY  Dispense: 30 tablet; Refill: 0  - losartan (COZAAR) 100 MG tablet [Pharmacy Med Name: LOSARTAN 100MG TABLETS]; TAKE 1 TABLET(100 MG) BY MOUTH DAILY  Dispense: 30 tablet; Refill: 0    2. Hypothyroid  - levothyroxine (SYNTHROID, LEVOTHROID) 112 MCG tablet [Pharmacy Med Name: LEVOTHYROXINE 0.112MG (112MCG) TABS]; TAKE 1 TABLET(112 MCG) BY MOUTH DAILY  Dispense: 30 tablet; Refill: 0    If protocol passes may refill for 12 months if within 3 months of last provider visit (or a total of 15 months).             Failed - Serum potassium within the past 12 months     No results found for: LN-POTASSIUM          Failed - Blood pressure filed in past 12 months     BP Readings from Last 1 Encounters:   11/16/18 138/80             Failed - Serum creatinine within the past 12 months     Creatinine   Date Value Ref Range Status   11/16/2018 0.74 0.60 - 1.10 mg/dL Final

## 2024-08-09 ENCOUNTER — TELEPHONE (OUTPATIENT)
Dept: FAMILY MEDICINE | Facility: CLINIC | Age: 64
End: 2024-08-09

## 2024-08-09 NOTE — TELEPHONE ENCOUNTER
General Call      Reason for Call:  Patient called to notify PCP that she   retired and moved to [place], [place].  Patient said she will be going to a clinic in [place].    Patient said her insurance coverage is for [place].  Thank you

## 2024-08-10 DIAGNOSIS — E78.5 HYPERLIPIDEMIA LDL GOAL <130: ICD-10-CM

## 2024-08-14 RX ORDER — ATORVASTATIN CALCIUM 40 MG/1
40 TABLET, FILM COATED ORAL DAILY
Qty: 90 TABLET | Refills: 0 | Status: SHIPPED | OUTPATIENT
Start: 2024-08-14

## 2024-08-15 NOTE — TELEPHONE ENCOUNTER
08/15/24  FYI to provider.  Attempted to schedule pt. Pt stated she has moved to [place] and will be establishing care there.  Ayala

## 2024-10-18 DIAGNOSIS — E03.9 HYPOTHYROIDISM, UNSPECIFIED TYPE: ICD-10-CM

## 2024-10-18 DIAGNOSIS — I10 ESSENTIAL HYPERTENSION: ICD-10-CM

## 2024-10-22 RX ORDER — LEVOTHYROXINE SODIUM 112 UG/1
112 TABLET ORAL DAILY
Qty: 90 TABLET | Refills: 0 | Status: SHIPPED | OUTPATIENT
Start: 2024-10-22

## 2024-10-22 RX ORDER — AMLODIPINE BESYLATE 10 MG/1
10 TABLET ORAL DAILY
Qty: 90 TABLET | Refills: 0 | Status: SHIPPED | OUTPATIENT
Start: 2024-10-22

## 2024-10-22 RX ORDER — HYDROCHLOROTHIAZIDE 25 MG/1
25 TABLET ORAL DAILY
Qty: 90 TABLET | Refills: 0 | Status: SHIPPED | OUTPATIENT
Start: 2024-10-22

## 2024-10-22 RX ORDER — LOSARTAN POTASSIUM 100 MG/1
100 TABLET ORAL DAILY
Qty: 90 TABLET | Refills: 0 | Status: SHIPPED | OUTPATIENT
Start: 2024-10-22

## 2024-10-22 NOTE — TELEPHONE ENCOUNTER
Pt returned call to update care team that she has moved to [place], [place] and has est care w/new provider.    Pt states new PCP Rx's her medications now.   PCP and Address updated in pt chart.    Routing to Dr. Liang for her to cancel Rx's sent to pharmacy.

## 2025-07-19 ENCOUNTER — HEALTH MAINTENANCE LETTER (OUTPATIENT)
Age: 65
End: 2025-07-19

## 2025-08-09 ENCOUNTER — HEALTH MAINTENANCE LETTER (OUTPATIENT)
Age: 65
End: 2025-08-09